# Patient Record
Sex: FEMALE | Race: WHITE | Employment: OTHER | URBAN - METROPOLITAN AREA
[De-identification: names, ages, dates, MRNs, and addresses within clinical notes are randomized per-mention and may not be internally consistent; named-entity substitution may affect disease eponyms.]

---

## 2017-01-03 ENCOUNTER — OFFICE VISIT (OUTPATIENT)
Dept: INTERNAL MEDICINE CLINIC | Facility: CLINIC | Age: 62
End: 2017-01-03

## 2017-01-03 VITALS
HEART RATE: 83 BPM | TEMPERATURE: 98 F | SYSTOLIC BLOOD PRESSURE: 112 MMHG | BODY MASS INDEX: 29.25 KG/M2 | RESPIRATION RATE: 20 BRPM | DIASTOLIC BLOOD PRESSURE: 75 MMHG | HEIGHT: 62.5 IN | WEIGHT: 163 LBS

## 2017-01-03 DIAGNOSIS — E78.5 HYPERLIPIDEMIA, UNSPECIFIED HYPERLIPIDEMIA TYPE: ICD-10-CM

## 2017-01-03 DIAGNOSIS — F33.8 SEASONAL AFFECTIVE DISORDER (HCC): ICD-10-CM

## 2017-01-03 DIAGNOSIS — F31.32 BIPOLAR I DISORDER, MOST RECENT EPISODE (OR CURRENT) DEPRESSED, MODERATE: ICD-10-CM

## 2017-01-03 DIAGNOSIS — Z00.00 PE (PHYSICAL EXAM), ROUTINE: Primary | ICD-10-CM

## 2017-01-03 DIAGNOSIS — E03.9 HYPOTHYROIDISM, UNSPECIFIED TYPE: ICD-10-CM

## 2017-01-03 DIAGNOSIS — Z12.11 SCREEN FOR COLON CANCER: ICD-10-CM

## 2017-01-03 PROCEDURE — 99396 PREV VISIT EST AGE 40-64: CPT | Performed by: INTERNAL MEDICINE

## 2017-01-03 PROCEDURE — 99213 OFFICE O/P EST LOW 20 MIN: CPT | Performed by: INTERNAL MEDICINE

## 2017-01-03 RX ORDER — GARLIC EXTRACT 500 MG
1 CAPSULE ORAL DAILY
COMMUNITY

## 2017-01-03 RX ORDER — LEVOTHYROXINE SODIUM 0.05 MG/1
50 TABLET ORAL
Qty: 90 TABLET | Refills: 0 | Status: SHIPPED | OUTPATIENT
Start: 2017-01-03 | End: 2017-04-20

## 2017-01-03 RX ORDER — MULTIVITAMIN WITH IRON
100 TABLET ORAL DAILY
COMMUNITY

## 2017-01-03 NOTE — PROGRESS NOTES
HPI:    Patient ID: Agustina Cornell is a 57year old female.     HPI Comments: Patient patient presents today for physical exam, states doing well otherwise, denies chest pain, shortness of breath, dyspnea on exertion or heart palpitations also denies nause Neurological: Negative for dizziness, vertigo, weakness and headaches. Psychiatric/Behavioral: Negative for confusion. The patient is not nervous/anxious.                Current Outpatient Prescriptions:  Levothyroxine Sodium (SYNTHROID) 50 MCG Oral Tab T Prochlorperazine Ed*    Hallucinations, Nausea and vomiting  Estrogens                 Ezetimibe               Rash  Statins                 Nausea only   PHYSICAL EXAM:   Physical Exam   Constitutional: She is oriented to person, place, and time.  She appe Pe (physical exam), routine  (primary encounter diagnosis)  Maintain a healthy balanced diet  Maintain a regular walking  As  Tolerated   Complete labs as ordered mammogram and pap  Smear  - recently  With  Her gyne - per pt  Was normal   Refer to gastro -

## 2017-01-04 ENCOUNTER — LAB ENCOUNTER (OUTPATIENT)
Dept: LAB | Age: 62
End: 2017-01-04
Attending: INTERNAL MEDICINE
Payer: COMMERCIAL

## 2017-01-04 DIAGNOSIS — Z00.00 PE (PHYSICAL EXAM), ROUTINE: ICD-10-CM

## 2017-01-04 DIAGNOSIS — E03.9 HYPOTHYROIDISM, UNSPECIFIED TYPE: ICD-10-CM

## 2017-01-04 LAB
ALBUMIN SERPL BCP-MCNC: 4.3 G/DL (ref 3.5–4.8)
ALBUMIN/GLOB SERPL: 1.7 {RATIO} (ref 1–2)
ALP SERPL-CCNC: 74 U/L (ref 32–100)
ALT SERPL-CCNC: 37 U/L (ref 14–54)
ANION GAP SERPL CALC-SCNC: 6 MMOL/L (ref 0–18)
AST SERPL-CCNC: 35 U/L (ref 15–41)
BACTERIA UR QL AUTO: NEGATIVE /HPF
BASOPHILS # BLD: 0.1 K/UL (ref 0–0.2)
BASOPHILS NFR BLD: 2 %
BILIRUB SERPL-MCNC: 0.7 MG/DL (ref 0.3–1.2)
BUN SERPL-MCNC: 17 MG/DL (ref 8–20)
BUN/CREAT SERPL: 14.7 (ref 10–20)
CALCIUM SERPL-MCNC: 9.7 MG/DL (ref 8.5–10.5)
CHLORIDE SERPL-SCNC: 109 MMOL/L (ref 95–110)
CHOLEST SERPL-MCNC: 246 MG/DL (ref 110–200)
CO2 SERPL-SCNC: 27 MMOL/L (ref 22–32)
CREAT SERPL-MCNC: 1.16 MG/DL (ref 0.5–1.5)
EOSINOPHIL # BLD: 0.2 K/UL (ref 0–0.7)
EOSINOPHIL NFR BLD: 4 %
ERYTHROCYTE [DISTWIDTH] IN BLOOD BY AUTOMATED COUNT: 13.9 % (ref 11–15)
GLOBULIN PLAS-MCNC: 2.6 G/DL (ref 2.5–3.7)
GLUCOSE SERPL-MCNC: 95 MG/DL (ref 70–99)
HCT VFR BLD AUTO: 43.5 % (ref 35–48)
HDLC SERPL-MCNC: 64 MG/DL
HGB BLD-MCNC: 14.4 G/DL (ref 12–16)
LDLC SERPL CALC-MCNC: 162 MG/DL (ref 0–99)
LYMPHOCYTES # BLD: 1.2 K/UL (ref 1–4)
LYMPHOCYTES NFR BLD: 24 %
MCH RBC QN AUTO: 30.4 PG (ref 27–32)
MCHC RBC AUTO-ENTMCNC: 33.1 G/DL (ref 32–37)
MCV RBC AUTO: 91.8 FL (ref 80–100)
MONOCYTES # BLD: 0.4 K/UL (ref 0–1)
MONOCYTES NFR BLD: 8 %
NEUTROPHILS # BLD AUTO: 3.2 K/UL (ref 1.8–7.7)
NEUTROPHILS NFR BLD: 63 %
NONHDLC SERPL-MCNC: 182 MG/DL
OSMOLALITY UR CALC.SUM OF ELEC: 295 MOSM/KG (ref 275–295)
PLATELET # BLD AUTO: 222 K/UL (ref 140–400)
PMV BLD AUTO: 9 FL (ref 7.4–10.3)
POTASSIUM SERPL-SCNC: 4.4 MMOL/L (ref 3.3–5.1)
PROT SERPL-MCNC: 6.9 G/DL (ref 5.9–8.4)
RBC # BLD AUTO: 4.74 M/UL (ref 3.7–5.4)
RBC #/AREA URNS AUTO: <1 /HPF
SODIUM SERPL-SCNC: 142 MMOL/L (ref 136–144)
TRIGL SERPL-MCNC: 99 MG/DL (ref 1–149)
TSH SERPL-ACNC: 3.69 UIU/ML (ref 0.34–5.6)
WBC # BLD AUTO: 5.2 K/UL (ref 4–11)
WBC #/AREA URNS AUTO: 1 /HPF

## 2017-01-04 PROCEDURE — 80053 COMPREHEN METABOLIC PANEL: CPT

## 2017-01-04 PROCEDURE — 81015 MICROSCOPIC EXAM OF URINE: CPT

## 2017-01-04 PROCEDURE — 36415 COLL VENOUS BLD VENIPUNCTURE: CPT

## 2017-01-04 PROCEDURE — 84443 ASSAY THYROID STIM HORMONE: CPT

## 2017-01-04 PROCEDURE — 85025 COMPLETE CBC W/AUTO DIFF WBC: CPT

## 2017-01-04 PROCEDURE — 80061 LIPID PANEL: CPT

## 2017-01-12 NOTE — PROGRESS NOTES
Quick Note:    Please call patient with test results. Labs are     CBC --blood count is good, no anemia.      CMP-sugar, electrolytes, kidney Function DECREASED - avoid ASA based medications advil Ibuprofen,,   liver function are normal.    TSH-thyroid h

## 2017-01-13 ENCOUNTER — TELEPHONE (OUTPATIENT)
Dept: INTERNAL MEDICINE CLINIC | Facility: CLINIC | Age: 62
End: 2017-01-13

## 2017-01-13 NOTE — TELEPHONE ENCOUNTER
----- Message from Allyssa Lee MD sent at 1/12/2017  9:01 AM CST -----  Please call patient with test results. Labs are     CBC --blood count is good, no anemia.      CMP-sugar, electrolytes, kidney  Function DECREASED - avoid   ASA   based medica

## 2017-02-14 ENCOUNTER — OFFICE VISIT (OUTPATIENT)
Dept: INTERNAL MEDICINE CLINIC | Facility: CLINIC | Age: 62
End: 2017-02-14

## 2017-02-14 VITALS
HEART RATE: 82 BPM | SYSTOLIC BLOOD PRESSURE: 113 MMHG | WEIGHT: 164 LBS | DIASTOLIC BLOOD PRESSURE: 75 MMHG | TEMPERATURE: 97 F | RESPIRATION RATE: 18 BRPM | BODY MASS INDEX: 29.43 KG/M2 | HEIGHT: 62.5 IN

## 2017-02-14 DIAGNOSIS — R94.4 DECREASED GFR: ICD-10-CM

## 2017-02-14 DIAGNOSIS — Z12.11 SCREEN FOR COLON CANCER: ICD-10-CM

## 2017-02-14 DIAGNOSIS — E78.5 HYPERLIPIDEMIA, UNSPECIFIED HYPERLIPIDEMIA TYPE: Primary | ICD-10-CM

## 2017-02-14 DIAGNOSIS — E03.9 HYPOTHYROIDISM, UNSPECIFIED TYPE: ICD-10-CM

## 2017-02-14 PROCEDURE — 99214 OFFICE O/P EST MOD 30 MIN: CPT | Performed by: INTERNAL MEDICINE

## 2017-02-14 PROCEDURE — 99212 OFFICE O/P EST SF 10 MIN: CPT | Performed by: INTERNAL MEDICINE

## 2017-02-14 NOTE — PROGRESS NOTES
HPI:    Patient ID: Nicole King is a 57year old female. Hyperlipidemia  This is a chronic problem. The current episode started more than 1 year ago. Exacerbating diseases include chronic renal disease and hypothyroidism.  She has no history of diabe Pyridoxine HCl (VITAMIN B-6) 100 MG Oral Tab Take 100 mg by mouth daily. Disp:  Rfl:    Acidophilus/Pectin Oral Cap Take 1 capsule by mouth daily. Disp:  Rfl:    BuPROPion HCl ER, XL, 300 MG Oral Tablet 24 Hr Take 300 mg by mouth daily.    Disp:  Rfl: and atraumatic. Right Ear: Tympanic membrane, external ear and ear canal normal.   Left Ear: Tympanic membrane, external ear and ear canal normal.   Nose: Nose normal. Right sinus exhibits no maxillary sinus tenderness and no frontal sinus tenderness.  Tamiko Gamez active advised  walking /exercise as  tolerated  · Counseling on ideal weight  Pt  Had all  Statins    Crestor Zocor  Lipitor  , pravastatin  , lopid -per Dr Kimberly Oneil . Alfa Ovalles     Did not  Tolerate  Medication -  Per patient can only  Sit     On couch  - cannot

## 2017-03-27 ENCOUNTER — OFFICE VISIT (OUTPATIENT)
Dept: NEPHROLOGY | Facility: CLINIC | Age: 62
End: 2017-03-27

## 2017-03-27 ENCOUNTER — TELEPHONE (OUTPATIENT)
Dept: NEPHROLOGY | Facility: CLINIC | Age: 62
End: 2017-03-27

## 2017-03-27 VITALS
HEIGHT: 62.5 IN | BODY MASS INDEX: 29.82 KG/M2 | HEART RATE: 86 BPM | SYSTOLIC BLOOD PRESSURE: 112 MMHG | DIASTOLIC BLOOD PRESSURE: 74 MMHG | WEIGHT: 166.19 LBS

## 2017-03-27 DIAGNOSIS — N18.30 CKD (CHRONIC KIDNEY DISEASE), STAGE III (HCC): ICD-10-CM

## 2017-03-27 DIAGNOSIS — R94.4 DECREASED GFR: Primary | ICD-10-CM

## 2017-03-27 PROCEDURE — 99244 OFF/OP CNSLTJ NEW/EST MOD 40: CPT | Performed by: INTERNAL MEDICINE

## 2017-03-27 PROCEDURE — 81002 URINALYSIS NONAUTO W/O SCOPE: CPT | Performed by: INTERNAL MEDICINE

## 2017-03-27 PROCEDURE — 99213 OFFICE O/P EST LOW 20 MIN: CPT | Performed by: INTERNAL MEDICINE

## 2017-03-27 RX ORDER — LITHIUM CARBONATE 300 MG/1
CAPSULE ORAL
COMMUNITY
End: 2017-03-27

## 2017-03-27 RX ORDER — CLONAZEPAM 2 MG/1
2.25 TABLET ORAL NIGHTLY
COMMUNITY

## 2017-03-27 RX ORDER — LEVOTHYROXINE SODIUM 0.05 MG/1
TABLET ORAL
COMMUNITY
End: 2017-03-27

## 2017-03-27 RX ORDER — HYOSCYAMINE SULFATE 0.125 MG
TABLET ORAL
COMMUNITY
End: 2017-03-27

## 2017-03-27 RX ORDER — BUPROPION HYDROCHLORIDE 150 MG/1
TABLET ORAL
Refills: 5 | COMMUNITY
Start: 2017-03-02 | End: 2018-11-15 | Stop reason: DRUGHIGH

## 2017-03-27 NOTE — PATIENT INSTRUCTIONS
Drink 5-6 glasses of water a day   Stop advil, ibuprofen   Blood test in one week   Follow up in 3-4 weeks

## 2017-03-27 NOTE — PROGRESS NOTES
Consult Requested By: Dr. Corazon Link     Reason for Consult: kidney dysfunction     HPI:     Konrad Perdue is a 57 yrs old female with pmh of IBS, bipolar disorder on lithium, hypothyroidism, CKD stage III who presented today for work up and [employer] • Lung Disorder Mother      emphysema   • Heart Disease Mother      CAD   • Schizophrenia Brother    • Arthritis Brother    • Sleep Apnea [Other] [OTHER] Brother    • Breast Cancer Mother      dx in 66's      Social History:   Smoking Status: Never Smoke (ESTRACE) 0.1 MG/GM Vaginal Cream Use 1 gm per vagina daily for 14 days and then twice weekly.  Disp: 1 Tube Rfl: 4   Spacer/Aero-Holding Chambers (VALVED HOLDING CHAMBER) Does not apply Device  Disp:  Rfl:        Allergies:    Crocus                      C noted  Musculoskeletal:  no deformities  Extremities: no edema  Neurological:  Grossly normal     ASSESSMENT/PLAN:     1. CKD stage stage III : non proteinuric  - creatinine stable at 1.1 - 1.15 mg/dl with an eGFR 49-50 ml/min since 2015  - was on NSAIDs -

## 2017-04-03 ENCOUNTER — TELEPHONE (OUTPATIENT)
Dept: NEPHROLOGY | Facility: CLINIC | Age: 62
End: 2017-04-03

## 2017-04-03 NOTE — TELEPHONE ENCOUNTER
Contacted pt and notified her of RSA's message below. She is going to lab tomorrow to do renal function panel. Per RSA, she may also do urine testing. Pt stated understanding.

## 2017-04-04 ENCOUNTER — APPOINTMENT (OUTPATIENT)
Dept: LAB | Age: 62
End: 2017-04-04
Attending: INTERNAL MEDICINE
Payer: COMMERCIAL

## 2017-04-04 DIAGNOSIS — N18.30 CKD (CHRONIC KIDNEY DISEASE), STAGE III (HCC): ICD-10-CM

## 2017-04-04 PROCEDURE — 36415 COLL VENOUS BLD VENIPUNCTURE: CPT

## 2017-04-04 PROCEDURE — 82570 ASSAY OF URINE CREATININE: CPT

## 2017-04-04 PROCEDURE — 81001 URINALYSIS AUTO W/SCOPE: CPT

## 2017-04-04 PROCEDURE — 84156 ASSAY OF PROTEIN URINE: CPT

## 2017-04-04 PROCEDURE — 80069 RENAL FUNCTION PANEL: CPT

## 2017-04-04 PROCEDURE — 82043 UR ALBUMIN QUANTITATIVE: CPT

## 2017-04-05 ENCOUNTER — TELEPHONE (OUTPATIENT)
Dept: NEPHROLOGY | Facility: CLINIC | Age: 62
End: 2017-04-05

## 2017-04-05 NOTE — TELEPHONE ENCOUNTER
Pt calling with info for  to call her psychologist - Dr Sanders [facility] [phone]

## 2017-04-06 NOTE — TELEPHONE ENCOUNTER
Call the number and left message for ruben with my cell number for Dr. Mac Dominguez to call back .      Staff pls Let patient know

## 2017-04-20 NOTE — TELEPHONE ENCOUNTER
Hypothyroid Medications  Protocol Criteria:  Appointment scheduled in the past 12 months or the next 3 months  TSH resulted in the past 12 months that is normal  Recent Visits       Provider Department Primary Dx    2 months ago MD Micky Shen

## 2017-04-22 RX ORDER — LEVOTHYROXINE SODIUM 0.05 MG/1
TABLET ORAL
Qty: 90 TABLET | Refills: 3 | Status: SHIPPED | OUTPATIENT
Start: 2017-04-22 | End: 2018-10-11

## 2017-04-25 ENCOUNTER — OFFICE VISIT (OUTPATIENT)
Dept: NEPHROLOGY | Facility: CLINIC | Age: 62
End: 2017-04-25

## 2017-04-25 VITALS
WEIGHT: 164.63 LBS | TEMPERATURE: 98 F | HEART RATE: 90 BPM | HEIGHT: 62.5 IN | SYSTOLIC BLOOD PRESSURE: 117 MMHG | DIASTOLIC BLOOD PRESSURE: 73 MMHG | BODY MASS INDEX: 29.54 KG/M2

## 2017-04-25 DIAGNOSIS — N18.30 CKD (CHRONIC KIDNEY DISEASE), STAGE III (HCC): Primary | ICD-10-CM

## 2017-04-25 PROCEDURE — 99214 OFFICE O/P EST MOD 30 MIN: CPT | Performed by: INTERNAL MEDICINE

## 2017-04-25 PROCEDURE — G0463 HOSPITAL OUTPT CLINIC VISIT: HCPCS | Performed by: INTERNAL MEDICINE

## 2017-04-25 NOTE — PROGRESS NOTES
Progress Note:     Ned Steiner is a 57 yrs old female with pmh of IBS, bipolar disorder on lithium, hypothyroidism, CKD stage III who presented today for work up and evaluation of kidney disease     Patient follows with psychiatrist Dr. Dayna Cervantes Mother      emphysema   • Heart Disease Mother      CAD   • Schizophrenia Brother    • Arthritis Brother    • Sleep Apnea [Other] [OTHER] Brother    • Breast Cancer Mother      dx in 66's      Social History:   Smoking Status: Never Smoker 4 (four) hours as needed. Disp: 1 Inhaler Rfl: 2       Allergies:    Crocus                      Comment:Other reaction(s): Other (See Comments)             \"feels like I'm going to pass out for hours. \"  Demerol                 Hallucinations  Dust : non proteinuric  - creatinine stable at 1.1 mg/dl with an eGFR 51 ml/min since 2015  - UA unremarkable   - UACR and UPCR negative   - lithium is associated with various renal pathology - chronic interstitial nephritis, proteinuria with FSGS, nephrogenic

## 2017-08-08 ENCOUNTER — OFFICE VISIT (OUTPATIENT)
Dept: INTERNAL MEDICINE CLINIC | Facility: CLINIC | Age: 62
End: 2017-08-08

## 2017-08-08 ENCOUNTER — TELEPHONE (OUTPATIENT)
Dept: INTERNAL MEDICINE CLINIC | Facility: CLINIC | Age: 62
End: 2017-08-08

## 2017-08-08 ENCOUNTER — APPOINTMENT (OUTPATIENT)
Dept: LAB | Age: 62
End: 2017-08-08
Attending: INTERNAL MEDICINE
Payer: COMMERCIAL

## 2017-08-08 VITALS
DIASTOLIC BLOOD PRESSURE: 75 MMHG | SYSTOLIC BLOOD PRESSURE: 116 MMHG | TEMPERATURE: 97 F | HEART RATE: 79 BPM | WEIGHT: 162 LBS | HEIGHT: 62 IN | BODY MASS INDEX: 29.81 KG/M2

## 2017-08-08 DIAGNOSIS — N18.30 CKD (CHRONIC KIDNEY DISEASE), STAGE III (HCC): ICD-10-CM

## 2017-08-08 DIAGNOSIS — Z01.818 PREOP EXAM FOR INTERNAL MEDICINE: ICD-10-CM

## 2017-08-08 DIAGNOSIS — Z01.818 PREOP EXAM FOR INTERNAL MEDICINE: Primary | ICD-10-CM

## 2017-08-08 DIAGNOSIS — M21.612 BUNION OF LEFT FOOT: ICD-10-CM

## 2017-08-08 LAB
ALBUMIN SERPL BCP-MCNC: 4.3 G/DL (ref 3.5–4.8)
ANION GAP SERPL CALC-SCNC: 5 MMOL/L (ref 0–18)
BUN SERPL-MCNC: 17 MG/DL (ref 8–20)
BUN/CREAT SERPL: 16 (ref 10–20)
CALCIUM SERPL-MCNC: 9.6 MG/DL (ref 8.5–10.5)
CHLORIDE SERPL-SCNC: 108 MMOL/L (ref 95–110)
CO2 SERPL-SCNC: 26 MMOL/L (ref 22–32)
CREAT SERPL-MCNC: 1.06 MG/DL (ref 0.5–1.5)
GLUCOSE SERPL-MCNC: 80 MG/DL (ref 70–99)
INR BLD: 1 (ref 0.9–1.2)
OSMOLALITY UR CALC.SUM OF ELEC: 289 MOSM/KG (ref 275–295)
PHOSPHATE SERPL-MCNC: 3.9 MG/DL (ref 2.4–4.7)
POTASSIUM SERPL-SCNC: 4.3 MMOL/L (ref 3.3–5.1)
PROTHROMBIN TIME: 13 SECONDS (ref 11.8–14.5)
SODIUM SERPL-SCNC: 139 MMOL/L (ref 136–144)

## 2017-08-08 PROCEDURE — 93010 ELECTROCARDIOGRAM REPORT: CPT | Performed by: INTERNAL MEDICINE

## 2017-08-08 PROCEDURE — G0463 HOSPITAL OUTPT CLINIC VISIT: HCPCS | Performed by: INTERNAL MEDICINE

## 2017-08-08 PROCEDURE — 80069 RENAL FUNCTION PANEL: CPT

## 2017-08-08 PROCEDURE — 93005 ELECTROCARDIOGRAM TRACING: CPT | Performed by: INTERNAL MEDICINE

## 2017-08-08 PROCEDURE — 36415 COLL VENOUS BLD VENIPUNCTURE: CPT

## 2017-08-08 PROCEDURE — 85610 PROTHROMBIN TIME: CPT

## 2017-08-08 PROCEDURE — 99214 OFFICE O/P EST MOD 30 MIN: CPT | Performed by: INTERNAL MEDICINE

## 2017-08-08 NOTE — PATIENT INSTRUCTIONS
Bunion    You have a bunion. This is a bony bump at the base of your big toe, along the inside edge of your foot. As the bump gets bigger, it can become red, swollen, and painful with shoe wear.   Bunions may occur if you wear shoes that are too tight and · To make an ice pack, put ice cubes in a sealed zip-lock plastic bag. Wrap the bag in a clean, thin towel or cloth. Never put ice or an ice pack directly on the skin.   · You may use over-the-counter pain medicine to control pain, unless another medicine w Physical therapy with ultrasound or whirlpool baths can ease pain, redness, and swelling. Severe cases may require surgery. If you don’t treat what is causing the bunion, it may get larger and more painful. Home care  · Limit high heels.  These shoes force © 6535-9030 [address], [address]. All rights reserved. This information is not intended as a substitute for professional medical care. Always follow your healthcare professional's instructions.

## 2017-08-08 NOTE — PROGRESS NOTES
Patient ID: Grayson Luciano is a 60year old female. Patient presents with:  Pre-Op Exam: bunion surgery left foot   Foot Pain: pain starting in right   Lab: was told Dr Tim Atkinson would decide if lab/EKG was needed.         HISTORY OF PRESENT ILLNESS:   HPI •  ClonazePAM (KLONOPIN) 2 MG Oral Tab, Take by mouth., Disp: , Rfl:   •  BuPROPion HCl ER, XL, 150 MG Oral Tablet 24 Hr, TK 1 T PO QD, Disp: , Rfl: 5  •  Pyridoxine HCl (VITAMIN B-6) 100 MG Oral Tab, Take 100 mg by mouth daily. , Disp: , Rfl:   •  Acidophi Smokeless tobacco: Never Used    Alcohol use No    Drug use: No    Sexual activity: Yes    Partners: Male    Birth control/ protection: Hysterectomy     Other Topics Concern    Caffeine Concern No     Social History Narrative   None on file           PHYS

## 2017-11-02 ENCOUNTER — HOSPITAL ENCOUNTER (OUTPATIENT)
Age: 62
Discharge: HOME | End: 2017-11-02
Attending: FAMILY MEDICINE
Payer: COMMERCIAL

## 2017-11-02 VITALS
OXYGEN SATURATION: 96 % | RESPIRATION RATE: 20 BRPM | WEIGHT: 158 LBS | HEIGHT: 62 IN | SYSTOLIC BLOOD PRESSURE: 127 MMHG | HEART RATE: 87 BPM | DIASTOLIC BLOOD PRESSURE: 65 MMHG | TEMPERATURE: 98 F | BODY MASS INDEX: 29.08 KG/M2

## 2017-11-02 DIAGNOSIS — S06.0X0A CONCUSSION WITHOUT LOSS OF CONSCIOUSNESS, INITIAL ENCOUNTER: ICD-10-CM

## 2017-11-02 DIAGNOSIS — S09.90XA CLOSED HEAD INJURY, INITIAL ENCOUNTER: Primary | ICD-10-CM

## 2017-11-02 PROCEDURE — 99213 OFFICE O/P EST LOW 20 MIN: CPT
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
## 2017-12-18 ENCOUNTER — OFFICE VISIT (OUTPATIENT)
Dept: NEPHROLOGY | Facility: CLINIC | Age: 62
End: 2017-12-18

## 2017-12-18 ENCOUNTER — APPOINTMENT (OUTPATIENT)
Dept: LAB | Facility: HOSPITAL | Age: 62
End: 2017-12-18
Attending: INTERNAL MEDICINE
Payer: COMMERCIAL

## 2017-12-18 VITALS
TEMPERATURE: 98 F | BODY MASS INDEX: 29.96 KG/M2 | DIASTOLIC BLOOD PRESSURE: 72 MMHG | WEIGHT: 162.81 LBS | SYSTOLIC BLOOD PRESSURE: 115 MMHG | HEIGHT: 62 IN | HEART RATE: 80 BPM

## 2017-12-18 DIAGNOSIS — N18.30 CKD (CHRONIC KIDNEY DISEASE), STAGE III (HCC): Primary | ICD-10-CM

## 2017-12-18 DIAGNOSIS — N18.30 CKD (CHRONIC KIDNEY DISEASE), STAGE III (HCC): ICD-10-CM

## 2017-12-18 PROCEDURE — 99212 OFFICE O/P EST SF 10 MIN: CPT | Performed by: INTERNAL MEDICINE

## 2017-12-18 PROCEDURE — 80069 RENAL FUNCTION PANEL: CPT

## 2017-12-18 PROCEDURE — 99213 OFFICE O/P EST LOW 20 MIN: CPT | Performed by: INTERNAL MEDICINE

## 2017-12-18 PROCEDURE — 36415 COLL VENOUS BLD VENIPUNCTURE: CPT

## 2017-12-18 NOTE — PROGRESS NOTES
Progress Note:     Elliot Silva is a 57 yrs old female with pmh of IBS, bipolar disorder on lithium, hypothyroidism, CKD stage III who presented today for follow up     Patient follows with psychiatrist Dr. Elvia Holbrook in [facility].  On lithium for 35 Disorder Mother      emphysema   • Heart Disease Mother      CAD   • Schizophrenia Brother    • Arthritis Brother    • Sleep Apnea [Other] [OTHER] Brother    • Breast Cancer Mother      dx in 66's      Social History: Smoking status: Never Smoker Comment:Other reaction(s): Other (See Comments)             \"feels like I'm going to pass out for hours. \"  Demerol                 Hallucinations  Dust                    Wheezing  Mold                    Coughing  Prochlorperazine Ed* 2015 - repeat test   - UA unremarkable   - UACR and UPCR negative   - lithium is associated with various renal pathology - chronic interstitial nephritis, proteinuria with FSGS, nephrogenic DI etc   - damage secondary to lithium is associated with cumulati

## 2017-12-21 ENCOUNTER — OFFICE VISIT (OUTPATIENT)
Dept: INTERNAL MEDICINE CLINIC | Facility: CLINIC | Age: 62
End: 2017-12-21

## 2017-12-21 VITALS
RESPIRATION RATE: 21 BRPM | TEMPERATURE: 97 F | WEIGHT: 160 LBS | BODY MASS INDEX: 29 KG/M2 | HEART RATE: 85 BPM | SYSTOLIC BLOOD PRESSURE: 155 MMHG | DIASTOLIC BLOOD PRESSURE: 71 MMHG | OXYGEN SATURATION: 99 %

## 2017-12-21 DIAGNOSIS — J01.90 ACUTE SINUSITIS, RECURRENCE NOT SPECIFIED, UNSPECIFIED LOCATION: Primary | ICD-10-CM

## 2017-12-21 DIAGNOSIS — N18.30 CHRONIC KIDNEY DISEASE, STAGE III (MODERATE) (HCC): ICD-10-CM

## 2017-12-21 PROCEDURE — 99214 OFFICE O/P EST MOD 30 MIN: CPT | Performed by: INTERNAL MEDICINE

## 2017-12-21 PROCEDURE — G0463 HOSPITAL OUTPT CLINIC VISIT: HCPCS | Performed by: INTERNAL MEDICINE

## 2017-12-21 RX ORDER — CEFUROXIME AXETIL 500 MG/1
500 TABLET ORAL 2 TIMES DAILY
Qty: 14 TABLET | Refills: 0 | Status: SHIPPED | OUTPATIENT
Start: 2017-12-21 | End: 2018-11-15 | Stop reason: ALTCHOICE

## 2017-12-22 NOTE — PROGRESS NOTES
HPI:    Patient ID: Mccoy Dubin is a 60year old female.   Presents for evaluation of the head congestion, concern of the kidney problem    HPI  Patient reports that for about 2 weeks she has been feeling congestion in her head, difficulty breathing fro Inhalation Nebu Soln Inhale into the lungs. Disp:  Rfl:    ClonazePAM (KLONOPIN) 2 MG Oral Tab Take by mouth.  Disp:  Rfl:    BuPROPion HCl ER, XL, 150 MG Oral Tablet 24 Hr TK 1 T PO QD Disp:  Rfl: 5   Pyridoxine HCl (VITAMIN B-6) 100 MG Oral Tab Take 100 m Atraumatic. Left Ear: No cerumen present  Nose: Mucosal edema present. No rhinorrhea. Right sinus exhibits frontal sinus tenderness. Left sinus exhibits maxillary sinus tenderness and frontal sinus tenderness.    Mouth/Throat: Oropharynx is clear and mois

## 2017-12-26 ENCOUNTER — MED REC SCAN ONLY (OUTPATIENT)
Dept: INTERNAL MEDICINE CLINIC | Facility: CLINIC | Age: 62
End: 2017-12-26

## 2017-12-28 ENCOUNTER — HOSPITAL ENCOUNTER (OUTPATIENT)
Dept: ULTRASOUND IMAGING | Age: 62
Discharge: HOME | End: 2017-12-28
Attending: INTERNAL MEDICINE
Payer: COMMERCIAL

## 2017-12-28 DIAGNOSIS — R94.4 DECREASED GFR: ICD-10-CM

## 2017-12-28 PROCEDURE — 76770 US EXAM ABDO BACK WALL COMP: CPT | Performed by: INTERNAL MEDICINE

## 2017-12-31 ENCOUNTER — HOSPITAL ENCOUNTER (OUTPATIENT)
Age: 62
Discharge: HOME | End: 2017-12-31
Payer: COMMERCIAL

## 2017-12-31 ENCOUNTER — APPOINTMENT (OUTPATIENT)
Dept: GENERAL RADIOLOGY | Age: 62
End: 2017-12-31
Attending: NURSE PRACTITIONER
Payer: COMMERCIAL

## 2017-12-31 VITALS
HEART RATE: 83 BPM | WEIGHT: 160 LBS | RESPIRATION RATE: 20 BRPM | SYSTOLIC BLOOD PRESSURE: 119 MMHG | OXYGEN SATURATION: 100 % | DIASTOLIC BLOOD PRESSURE: 64 MMHG | TEMPERATURE: 98 F | BODY MASS INDEX: 29.44 KG/M2 | HEIGHT: 62 IN

## 2017-12-31 DIAGNOSIS — S86.911A STRAIN OF RIGHT KNEE, INITIAL ENCOUNTER: Primary | ICD-10-CM

## 2017-12-31 PROCEDURE — 73562 X-RAY EXAM OF KNEE 3: CPT | Performed by: NURSE PRACTITIONER

## 2017-12-31 PROCEDURE — 99213 OFFICE O/P EST LOW 20 MIN: CPT

## 2017-12-31 RX ORDER — ACETAMINOPHEN 500 MG
1000 TABLET ORAL ONCE
Status: COMPLETED | OUTPATIENT
Start: 2017-12-31 | End: 2017-12-31

## 2017-12-31 NOTE — ED PROVIDER NOTES
Patient presents with:  Musculoskeletal Problem      HPI:     Rach Nicholson is a 60year old female who presents today with a chief complaint of pain in the right knee after twisting her knee last night when she switched positions.   She states she has a systems reviewed and negative except as noted above. Constitutional and Vital Signs Reviewed.       Physical Exam:   Findings:  EXTREMITIES: no cyanosis or edema   Edema  No  Bruising:  No  Tendon function intact:  Yes  Skin intact:  Yes  Normal sensation: dose of Tylenol was given for discomfort. I recommended ice, elevation, Tylenol for discomfort, and close follow-up with orthopedics.     Diagnosis:    ICD-10-CM    1. Strain of right knee, initial encounter W73.711U        All results reviewed and discuss

## 2018-03-14 ENCOUNTER — HOSPITAL ENCOUNTER (OUTPATIENT)
Age: 63
Discharge: HOME | End: 2018-03-14
Payer: COMMERCIAL

## 2018-03-14 ENCOUNTER — NURSE TRIAGE (OUTPATIENT)
Dept: OTHER | Age: 63
End: 2018-03-14

## 2018-03-14 ENCOUNTER — APPOINTMENT (OUTPATIENT)
Dept: GENERAL RADIOLOGY | Age: 63
End: 2018-03-14
Attending: PHYSICIAN ASSISTANT
Payer: COMMERCIAL

## 2018-03-14 VITALS
TEMPERATURE: 98 F | OXYGEN SATURATION: 100 % | DIASTOLIC BLOOD PRESSURE: 79 MMHG | HEART RATE: 87 BPM | RESPIRATION RATE: 16 BRPM | WEIGHT: 158 LBS | BODY MASS INDEX: 29 KG/M2 | SYSTOLIC BLOOD PRESSURE: 149 MMHG

## 2018-03-14 DIAGNOSIS — J06.9 VIRAL UPPER RESPIRATORY TRACT INFECTION: Primary | ICD-10-CM

## 2018-03-14 PROCEDURE — 71046 X-RAY EXAM CHEST 2 VIEWS: CPT | Performed by: PHYSICIAN ASSISTANT

## 2018-03-14 PROCEDURE — 99214 OFFICE O/P EST MOD 30 MIN: CPT

## 2018-03-14 PROCEDURE — 99213 OFFICE O/P EST LOW 20 MIN: CPT

## 2018-03-14 RX ORDER — ALBUTEROL SULFATE 90 UG/1
2 AEROSOL, METERED RESPIRATORY (INHALATION) EVERY 4 HOURS PRN
Qty: 1 INHALER | Refills: 0 | Status: SHIPPED | OUTPATIENT
Start: 2018-03-14 | End: 2018-04-13

## 2018-03-14 NOTE — ED INITIAL ASSESSMENT (HPI)
Headache  Dry cough  Wheezing  Crackling in left chest  No fever  Symptoms started last night  No change with Albuterol inhaler    HX: sinusitis 12/2017  -flu vaccine
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
## 2018-03-14 NOTE — TELEPHONE ENCOUNTER
Action Requested: Summary for Provider     []  Critical Lab, Recommendations Needed  [] Need Additional Advice  []   FYI    []   Need Orders  [] Need Medications Sent to Pharmacy  []  Other     SUMMARY: Advised to go to [facility] now; she agreed, plan

## 2018-04-19 ENCOUNTER — CHARTING TRANS (OUTPATIENT)
Dept: OTHER | Age: 63
End: 2018-04-19

## 2018-05-15 ENCOUNTER — IMAGING SERVICES (OUTPATIENT)
Dept: OTHER | Age: 63
End: 2018-05-15

## 2018-05-15 ENCOUNTER — HOSPITAL (OUTPATIENT)
Dept: OTHER | Age: 63
End: 2018-05-15
Attending: LEGAL MEDICINE

## 2018-10-11 DIAGNOSIS — R74.8 ELEVATED LIVER ENZYMES: Primary | ICD-10-CM

## 2018-10-12 NOTE — TELEPHONE ENCOUNTER
Failed per nursing protocol - please advise on refill request     Hypothyroid Medications  Protocol Criteria:  Appointment scheduled in the past 12 months or the next 3 months  TSH resulted in the past 12 months that is normal  Recent Outpatient Visits            9 months ago Acute sinusitis, recurrence not specified, unspecified location    Sd Barnes [place], MD    Office Visit    9 months ago CKD (chronic kidney disease), stage Stephanie Guidryer, [address] [place] [address], Oscar Quintana MD    Office Visit    1 year ago Preop exam for internal medicine    Paco Barnes, Kim Fontaine MD    Office Visit    1 year ago CKD (chronic kidney disease), stage Mariano De [place], MD    Office Visit    1 year ago Decreased GFR    Wilber Knutson [place], MD    Office Visit        Future Appointments       Provider Department Appt Notes    In 1 week Valentine Moon MD [facility], [address], [place] [facility] px , f/u on meds        Lab Results   Component Value Date    TSH 3.69 01/04/2017

## 2018-10-14 RX ORDER — LEVOTHYROXINE SODIUM 0.05 MG/1
TABLET ORAL
Qty: 90 TABLET | Refills: 0 | Status: SHIPPED | OUTPATIENT
Start: 2018-10-14 | End: 2018-11-15

## 2018-11-01 VITALS
SYSTOLIC BLOOD PRESSURE: 114 MMHG | HEART RATE: 72 BPM | DIASTOLIC BLOOD PRESSURE: 72 MMHG | WEIGHT: 138.19 LBS | HEIGHT: 62 IN | BODY MASS INDEX: 25.43 KG/M2

## 2018-11-15 ENCOUNTER — OFFICE VISIT (OUTPATIENT)
Dept: INTERNAL MEDICINE CLINIC | Facility: CLINIC | Age: 63
End: 2018-11-15
Payer: COMMERCIAL

## 2018-11-15 VITALS
WEIGHT: 166.19 LBS | SYSTOLIC BLOOD PRESSURE: 127 MMHG | TEMPERATURE: 98 F | RESPIRATION RATE: 20 BRPM | HEART RATE: 92 BPM | HEIGHT: 62 IN | DIASTOLIC BLOOD PRESSURE: 77 MMHG | BODY MASS INDEX: 30.58 KG/M2

## 2018-11-15 DIAGNOSIS — E55.9 VITAMIN D DEFICIENCY: ICD-10-CM

## 2018-11-15 DIAGNOSIS — Z23 INFLUENZA VACCINATION GIVEN: ICD-10-CM

## 2018-11-15 DIAGNOSIS — Z00.00 PE (PHYSICAL EXAM), ROUTINE: Primary | ICD-10-CM

## 2018-11-15 DIAGNOSIS — Z12.11 SCREENING FOR MALIGNANT NEOPLASM OF COLON: ICD-10-CM

## 2018-11-15 DIAGNOSIS — M79.671 RIGHT FOOT PAIN: ICD-10-CM

## 2018-11-15 DIAGNOSIS — F31.32 MODERATE DEPRESSED BIPOLAR I DISORDER (HCC): ICD-10-CM

## 2018-11-15 DIAGNOSIS — E03.9 HYPOTHYROIDISM, UNSPECIFIED TYPE: ICD-10-CM

## 2018-11-15 DIAGNOSIS — E78.5 HYPERLIPIDEMIA, UNSPECIFIED HYPERLIPIDEMIA TYPE: ICD-10-CM

## 2018-11-15 PROCEDURE — 99214 OFFICE O/P EST MOD 30 MIN: CPT | Performed by: INTERNAL MEDICINE

## 2018-11-15 PROCEDURE — 90471 IMMUNIZATION ADMIN: CPT | Performed by: INTERNAL MEDICINE

## 2018-11-15 PROCEDURE — 99212 OFFICE O/P EST SF 10 MIN: CPT | Performed by: INTERNAL MEDICINE

## 2018-11-15 PROCEDURE — 90686 IIV4 VACC NO PRSV 0.5 ML IM: CPT | Performed by: INTERNAL MEDICINE

## 2018-11-15 RX ORDER — LEVOTHYROXINE SODIUM 0.05 MG/1
TABLET ORAL
Qty: 90 TABLET | Refills: 1 | Status: SHIPPED | OUTPATIENT
Start: 2018-11-15

## 2018-11-15 RX ORDER — BUPROPION HYDROCHLORIDE 300 MG/1
TABLET ORAL
COMMUNITY
Start: 2018-11-14

## 2018-11-15 NOTE — PROGRESS NOTES
HPI:    Patient ID: Halina Mancuso is a 58year old female.   Patient presents with:  Physical: Pt is presenting today for a physical   ,evl    Patient presents today for physical exam and  Thyroid check , states doing well otherwise, she states she is s Psychiatric/Behavioral: Negative for confusion. The patient is not nervous/anxious.                Current Outpatient Medications:  BuPROPion HCl ER, XL, 300 MG Oral Tablet 24 Hr  Disp:  Rfl:    Levothyroxine Sodium 50 MCG Oral Tab TAKE 1 TABLET BEFORE YON Ezetimibe               RASH  Statins                 OTHER (SEE COMMENTS)    Comment:Weakness   PHYSICAL EXAM:   Physical Exam   Constitutional: She is oriented to person, place, and time. She appears well-developed and well-nourished. No distress.    over Blood pressure 127/77, pulse 92, temperature 98.3 °F (36.8 °C), temperature source Oral, resp. rate 20, height 5' 2\" (1.575 m), weight 166 lb 3.2 oz (75.4 kg), not currently breastfeeding.              ASSESSMENT/PLAN:   Pe (physical exam), routine  (prima -Testing was done with  [facility] -    Per  Patient   Was  normal  -   advised mammogram f/u in  1  Year -health testing completed in-  8/18 -patient states she will bring next time her test results and records        osteopenia -DEXA scan completed August 18

## 2018-11-16 ENCOUNTER — LAB ENCOUNTER (OUTPATIENT)
Dept: LAB | Age: 63
End: 2018-11-16
Attending: INTERNAL MEDICINE
Payer: COMMERCIAL

## 2018-11-16 DIAGNOSIS — E03.9 HYPOTHYROIDISM, UNSPECIFIED TYPE: ICD-10-CM

## 2018-11-16 DIAGNOSIS — E55.9 VITAMIN D DEFICIENCY: ICD-10-CM

## 2018-11-16 DIAGNOSIS — Z00.00 PE (PHYSICAL EXAM), ROUTINE: ICD-10-CM

## 2018-11-16 DIAGNOSIS — E78.5 HYPERLIPIDEMIA, UNSPECIFIED HYPERLIPIDEMIA TYPE: ICD-10-CM

## 2018-11-16 PROCEDURE — 82306 VITAMIN D 25 HYDROXY: CPT

## 2018-11-16 PROCEDURE — 81015 MICROSCOPIC EXAM OF URINE: CPT

## 2018-11-16 PROCEDURE — 80061 LIPID PANEL: CPT

## 2018-11-16 PROCEDURE — 80053 COMPREHEN METABOLIC PANEL: CPT

## 2018-11-16 PROCEDURE — 36415 COLL VENOUS BLD VENIPUNCTURE: CPT

## 2018-11-16 PROCEDURE — 85025 COMPLETE CBC W/AUTO DIFF WBC: CPT

## 2018-11-16 PROCEDURE — 84443 ASSAY THYROID STIM HORMONE: CPT

## 2018-11-21 ENCOUNTER — NURSE TRIAGE (OUTPATIENT)
Dept: OTHER | Age: 63
End: 2018-11-21

## 2018-11-21 NOTE — TELEPHONE ENCOUNTER
Action Requested: Summary for Provider     []  Critical Lab, Recommendations Needed  [] Need Additional Advice  []   FYI    []   Need Orders  [] Need Medications Sent to Pharmacy  []  Other     SUMMARY: Dr Frankey Bowl: patient asking what she can take or d

## 2018-11-26 NOTE — TELEPHONE ENCOUNTER
LMTCB  Transfer to triage. The patient has not looked at the [facility] message as yet.

## 2018-11-29 ENCOUNTER — HOSPITAL ENCOUNTER (OUTPATIENT)
Dept: GENERAL RADIOLOGY | Age: 63
Discharge: HOME | End: 2018-11-29
Attending: INTERNAL MEDICINE
Payer: COMMERCIAL

## 2018-11-29 ENCOUNTER — APPOINTMENT (OUTPATIENT)
Dept: LAB | Age: 63
End: 2018-11-29
Attending: INTERNAL MEDICINE
Payer: COMMERCIAL

## 2018-11-29 DIAGNOSIS — G89.29 CHRONIC TOE PAIN, RIGHT FOOT: ICD-10-CM

## 2018-11-29 DIAGNOSIS — R74.8 ELEVATED LIVER ENZYMES: ICD-10-CM

## 2018-11-29 DIAGNOSIS — M79.674 CHRONIC TOE PAIN, RIGHT FOOT: ICD-10-CM

## 2018-11-29 PROCEDURE — 86708 HEPATITIS A ANTIBODY: CPT

## 2018-11-29 PROCEDURE — 73630 X-RAY EXAM OF FOOT: CPT | Performed by: INTERNAL MEDICINE

## 2018-11-29 PROCEDURE — 36415 COLL VENOUS BLD VENIPUNCTURE: CPT

## 2018-11-29 PROCEDURE — 86706 HEP B SURFACE ANTIBODY: CPT

## 2018-11-29 PROCEDURE — 87340 HEPATITIS B SURFACE AG IA: CPT

## 2018-11-29 PROCEDURE — 80500 HEPATITIS A B + C PROFILE: CPT

## 2018-11-29 PROCEDURE — 82977 ASSAY OF GGT: CPT

## 2018-11-29 PROCEDURE — 86803 HEPATITIS C AB TEST: CPT

## 2018-11-29 PROCEDURE — 80053 COMPREHEN METABOLIC PANEL: CPT

## 2018-11-29 PROCEDURE — 86704 HEP B CORE ANTIBODY TOTAL: CPT

## 2018-12-03 ENCOUNTER — TELEPHONE (OUTPATIENT)
Dept: OTHER | Age: 63
End: 2018-12-03
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
## 2018-12-03 NOTE — TELEPHONE ENCOUNTER
----- Message from Sparkle Ogden MD sent at 12/3/2018  8:14 AM CST -----  Please call patient liver enzymes are normal but  -hepatitis B core antibody present-possible previous exposure from hepatitis B-would like patient to see gastroenterologist-for

## 2018-12-04 NOTE — TELEPHONE ENCOUNTER
Patient need to see the gastroenterologist-for the liver elevated enzymes patient has that on and off for some time  Also that positive hepatitis B and C core antibody-make sure patient does not have any active disease  Patient does have ultrasound of the

## 2018-12-04 NOTE — TELEPHONE ENCOUNTER
Called pt. Pt refused to here the recommendation or results. Pt states she doing fine. Pt verbalized will call tomorrow to have her records faxed over to another provider. Informed pt to call Medical records to have chart faxed to the new provider.  Daniela Humphries

## 2018-12-13 ENCOUNTER — HOSPITAL ENCOUNTER (OUTPATIENT)
Dept: ULTRASOUND IMAGING | Age: 63
Discharge: HOME | End: 2018-12-13
Attending: INTERNAL MEDICINE
Payer: COMMERCIAL

## 2018-12-13 DIAGNOSIS — R74.8 ELEVATED LIVER ENZYMES: ICD-10-CM

## 2018-12-13 PROCEDURE — 76705 ECHO EXAM OF ABDOMEN: CPT | Performed by: INTERNAL MEDICINE

## 2018-12-20 ENCOUNTER — APPOINTMENT (OUTPATIENT)
Dept: GENERAL RADIOLOGY | Age: 63
End: 2018-12-20
Attending: EMERGENCY MEDICINE
Payer: COMMERCIAL

## 2018-12-20 ENCOUNTER — HOSPITAL ENCOUNTER (OUTPATIENT)
Age: 63
Discharge: HOME | End: 2018-12-20
Attending: EMERGENCY MEDICINE
Payer: COMMERCIAL

## 2018-12-20 ENCOUNTER — NURSE TRIAGE (OUTPATIENT)
Dept: OTHER | Age: 63
End: 2018-12-20

## 2018-12-20 VITALS
OXYGEN SATURATION: 99 % | TEMPERATURE: 98 F | DIASTOLIC BLOOD PRESSURE: 60 MMHG | SYSTOLIC BLOOD PRESSURE: 139 MMHG | HEART RATE: 95 BPM | RESPIRATION RATE: 20 BRPM

## 2018-12-20 DIAGNOSIS — J04.0 ACUTE LARYNGITIS: ICD-10-CM

## 2018-12-20 DIAGNOSIS — J40 BRONCHITIS: Primary | ICD-10-CM

## 2018-12-20 PROCEDURE — 71046 X-RAY EXAM CHEST 2 VIEWS: CPT | Performed by: EMERGENCY MEDICINE

## 2018-12-20 PROCEDURE — 99213 OFFICE O/P EST LOW 20 MIN: CPT

## 2018-12-20 NOTE — TELEPHONE ENCOUNTER
appt made for tomorrow, s/s sore throat today, coughing- productive-stated can hear wheezing until she cough up phlegm-advised UC/ER if s/s worsens  Reason for Disposition  • Fever present > 3 days (72 hours)    Protocols used: SORE THROAT-A-OH

## 2018-12-21 NOTE — ED INITIAL ASSESSMENT (HPI)
Cough since 1 pm, + sore throat, tactile fever, was seen by pmd today and was given tessalon and flonase

## 2018-12-21 NOTE — ED PROVIDER NOTES
Patient Seen in: [facility]    History   Patient presents with:  Cough/URI    Stated Complaint: cough    HPI  Patient states she had mild runny nose yesterday and started coughing today.   This afternoon she lost her voice °F (36.5 °C)   Temp src Oral   SpO2 99 %   O2 Device None (Room air)       Current:/60   Pulse 95   Temp 97.7 °F (36.5 °C) (Oral)   Resp 20   SpO2 99%         Physical Exam   Constitutional: She is oriented to person, place, and time.  She appears wel she is to go to the emergency department for further evaluation. All questions were answered prior to discharge.     Disposition and Plan     Clinical Impression:  Bronchitis  (primary encounter diagnosis)  Acute laryngitis    Disposition:  Discharge  12/2

## 2019-01-07 ENCOUNTER — TELEPHONE (OUTPATIENT)
Dept: INTERNAL MEDICINE CLINIC | Facility: CLINIC | Age: 64
End: 2019-01-07

## 2019-01-07 NOTE — TELEPHONE ENCOUNTER
Letter from patient received at [place]. \" Dr. Js Hampton, Please quit sending info about tests and correspondence because my new Dr, Elma Cerda, is quite aware of all my test results from Oct, [address], Dec of 2018. Please obey my wishes. \"    Unable to

## 2019-01-08 NOTE — TELEPHONE ENCOUNTER
Letter from patient received at [address] [place] [address] 402. \" Dr. Xander Lara, Please quit sending info about tests and correspondence because my new Dr, Juan Bowen, is quite aware of all my test results from Oct, [address], Dec of 2018. Please obey my wishes. \"     Unable

## 2019-01-08 NOTE — TELEPHONE ENCOUNTER
According to [facility] website  Joanne Hartman D.O.   [address]  [address] [place] [address]  Phone: [phone]  Fax: [phone]

## 2019-01-08 NOTE — TELEPHONE ENCOUNTER
Patient contacted, she confirmed her new PCP is Dr Irlanda Ingram.  She no longer wants to received phone messages or my Chart messages from Dr Tutu Rothman updated the PCP to Dr Dago Bullock

## 2019-03-06 PROBLEM — R19.5 POSITIVE COLORECTAL CANCER SCREENING USING COLOGUARD TEST: Status: ACTIVE | Noted: 2019-03-06

## 2019-03-18 PROCEDURE — 88305 TISSUE EXAM BY PATHOLOGIST: CPT | Performed by: INTERNAL MEDICINE

## 2022-06-03 ENCOUNTER — HOSPITAL ENCOUNTER (EMERGENCY)
Facility: HOSPITAL | Age: 67
Discharge: HOME | End: 2022-06-03
Attending: EMERGENCY MEDICINE
Payer: MEDICARE

## 2022-06-03 VITALS
SYSTOLIC BLOOD PRESSURE: 107 MMHG | WEIGHT: 143.06 LBS | RESPIRATION RATE: 18 BRPM | OXYGEN SATURATION: 98 % | TEMPERATURE: 98 F | DIASTOLIC BLOOD PRESSURE: 60 MMHG | BODY MASS INDEX: 26 KG/M2 | HEART RATE: 76 BPM

## 2022-06-03 DIAGNOSIS — R53.1 WEAKNESS GENERALIZED: Primary | ICD-10-CM

## 2022-06-03 LAB
ALBUMIN SERPL-MCNC: 4.2 G/DL (ref 3.4–5)
ALBUMIN/GLOB SERPL: 1.2 {RATIO} (ref 1–2)
ALP LIVER SERPL-CCNC: 104 U/L
ALT SERPL-CCNC: 31 U/L
ANION GAP SERPL CALC-SCNC: 4 MMOL/L (ref 0–18)
AST SERPL-CCNC: 25 U/L (ref 15–37)
BASOPHILS # BLD AUTO: 0.02 X10(3) UL (ref 0–0.2)
BASOPHILS NFR BLD AUTO: 0.6 %
BILIRUB SERPL-MCNC: 0.3 MG/DL (ref 0.1–2)
BILIRUB UR QL: NEGATIVE
BUN BLD-MCNC: 12 MG/DL (ref 7–18)
BUN/CREAT SERPL: 12 (ref 10–20)
CALCIUM BLD-MCNC: 9.8 MG/DL (ref 8.5–10.1)
CHLORIDE SERPL-SCNC: 109 MMOL/L (ref 98–112)
CLARITY UR: CLEAR
CO2 SERPL-SCNC: 28 MMOL/L (ref 21–32)
COLOR UR: YELLOW
CREAT BLD-MCNC: 1 MG/DL
DEPRECATED RDW RBC AUTO: 43.3 FL (ref 35.1–46.3)
EOSINOPHIL # BLD AUTO: 0.01 X10(3) UL (ref 0–0.7)
EOSINOPHIL NFR BLD AUTO: 0.3 %
ERYTHROCYTE [DISTWIDTH] IN BLOOD BY AUTOMATED COUNT: 12.4 % (ref 11–15)
GLOBULIN PLAS-MCNC: 3.4 G/DL (ref 2.8–4.4)
GLUCOSE BLD-MCNC: 99 MG/DL (ref 70–99)
GLUCOSE UR-MCNC: NEGATIVE MG/DL
HCT VFR BLD AUTO: 46.1 %
HGB BLD-MCNC: 15 G/DL
IMM GRANULOCYTES # BLD AUTO: 0.01 X10(3) UL (ref 0–1)
IMM GRANULOCYTES NFR BLD: 0.3 %
KETONES UR-MCNC: NEGATIVE MG/DL
LYMPHOCYTES # BLD AUTO: 0.89 X10(3) UL (ref 1–4)
LYMPHOCYTES NFR BLD AUTO: 26.7 %
MAGNESIUM SERPL-MCNC: 2.4 MG/DL (ref 1.6–2.6)
MCH RBC QN AUTO: 30.5 PG (ref 26–34)
MCHC RBC AUTO-ENTMCNC: 32.5 G/DL (ref 31–37)
MCV RBC AUTO: 93.7 FL
MONOCYTES # BLD AUTO: 0.31 X10(3) UL (ref 0.1–1)
MONOCYTES NFR BLD AUTO: 9.3 %
NEUTROPHILS # BLD AUTO: 2.09 X10 (3) UL (ref 1.5–7.7)
NEUTROPHILS # BLD AUTO: 2.09 X10(3) UL (ref 1.5–7.7)
NEUTROPHILS NFR BLD AUTO: 62.8 %
NITRITE UR QL STRIP.AUTO: NEGATIVE
OSMOLALITY SERPL CALC.SUM OF ELEC: 292 MOSM/KG (ref 275–295)
PH UR: 5.5 [PH] (ref 5–8)
PLATELET # BLD AUTO: 217 10(3)UL (ref 150–450)
POTASSIUM SERPL-SCNC: 3.7 MMOL/L (ref 3.5–5.1)
PROT SERPL-MCNC: 7.6 G/DL (ref 6.4–8.2)
PROT UR-MCNC: NEGATIVE MG/DL
RBC # BLD AUTO: 4.92 X10(6)UL
SARS-COV-2 RNA RESP QL NAA+PROBE: NOT DETECTED
SODIUM SERPL-SCNC: 141 MMOL/L (ref 136–145)
SP GR UR STRIP: 1.01 (ref 1–1.03)
UROBILINOGEN UR STRIP-ACNC: 0.2
WBC # BLD AUTO: 3.3 X10(3) UL (ref 4–11)

## 2022-06-03 PROCEDURE — 81001 URINALYSIS AUTO W/SCOPE: CPT | Performed by: EMERGENCY MEDICINE

## 2022-06-03 PROCEDURE — 36415 COLL VENOUS BLD VENIPUNCTURE: CPT

## 2022-06-03 PROCEDURE — 87086 URINE CULTURE/COLONY COUNT: CPT | Performed by: EMERGENCY MEDICINE

## 2022-06-03 PROCEDURE — 80053 COMPREHEN METABOLIC PANEL: CPT | Performed by: EMERGENCY MEDICINE

## 2022-06-03 PROCEDURE — 83735 ASSAY OF MAGNESIUM: CPT | Performed by: EMERGENCY MEDICINE

## 2022-06-03 PROCEDURE — 85025 COMPLETE CBC W/AUTO DIFF WBC: CPT | Performed by: EMERGENCY MEDICINE

## 2022-06-03 PROCEDURE — 99283 EMERGENCY DEPT VISIT LOW MDM: CPT

## 2022-06-03 NOTE — ED INITIAL ASSESSMENT (HPI)
PT suspects she is dehydrated, has had decrased appetite recently, as well as heavy laxative use to relieve constipation. Increasing weakness.

## 2022-06-26 PROBLEM — E87.6 HYPOKALEMIA: Status: ACTIVE | Noted: 2022-06-26

## 2022-06-26 NOTE — ED INITIAL ASSESSMENT (HPI)
Patient presents with tremors/ numbness all over since October. Notes it is worsening, patient has seen neurology for this.     Arrives from home via [facility] [place]

## 2022-06-27 PROBLEM — F41.9 ANXIETY WITH SOMATIZATION: Status: ACTIVE | Noted: 2022-06-27

## 2022-06-27 PROBLEM — F45.0 ANXIETY WITH SOMATIZATION: Status: ACTIVE | Noted: 2022-06-27

## 2022-06-27 PROBLEM — R45.851 SUICIDAL IDEATION: Status: ACTIVE | Noted: 2022-06-27

## 2022-06-27 PROBLEM — F31.5 BIPOLAR I DISORDER, MOST RECENT EPISODE DEPRESSED, SEVERE W PSYCHOSIS (HCC): Status: ACTIVE | Noted: 2022-06-27

## 2022-06-27 NOTE — ED QUICK NOTES
Pt attempted to stand up to go to bathroom with 2 assist, pt sts that she is too dizzy standing up and went back to bed, assisted in bed for comfort, pt on purewick at this time, refused to use bedpan when offered
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
## 2022-06-27 NOTE — ED PROVIDER NOTES
Patient endorsed to me by Dr Arcadio Frazier in setting of difficulty functioning at home. Patient requesting albuterol inhaler at 1 point during ED stay although no wheezing or respiratory distress on my assessment and then ultimately declined the inhaler. Patient also with urinary retention that was relieved by straight cath performed by RN. Patient had over 700 mL of urine in her bladder. Patient states that she has frequent issues with incontinence and inability to urinate secondary to \"her nerves. \"  She states that she is bedbound and has seen neurology and had multiple tests done and declines any further testing at this time. Awaiting psychiatric transfer. 6/28: still no issue on shift. Awaiting transfer/placement.

## 2022-06-27 NOTE — ED QUICK NOTES
Patient states that she has history of asthma and feels tight. Patient asks for inhaler. Lungs are clear, patient saturates 100% on RA.

## 2022-06-27 NOTE — ED NOTES
Received a call from [facility] stating that their unit is full. It was suggested to call back tomorrow if placement is still needed. Called [facility] and spoke with Camilla to make referral. Orlin Milan stated that their Business Office will need to review the [employer]. Referral Packet has been faxed over for review.

## 2022-06-27 NOTE — ED NOTES
Called the following Hospitals for in-patient placement:    [facility]- No beds available    [facility]- No beds available today. However, referral was made for review tomorrow. [facility]- Referral made over the phone. No beds available    [facility]- Left voice mail message. Referral Packet has been faxed for review    [facility]- No beds available    [facility]- No beds available     [facility]- Referral was made over the phone. Referral Packet has been faxed for review.

## 2022-06-27 NOTE — PROGRESS NOTES
Angel Rueda explained that she \"just sits on the toilet\" until she urinates at home. Does not self-cath. States difficulty with urination is related in her mind to the height of the toilet in their home, which is higher to accommodate her 's need after hip replacement. She also thinks it is related to a pinched nerve.

## 2022-06-27 NOTE — PROGRESS NOTES
06/27/22 1300   Current Living Situation   Current Living Situation Private Residence   S[facility] and [facility]   Is the patient verbal? Yes   Vision or hearing correction? Eye Glasses   Patient able to understand and follow directions? Yes   Patient able to express needs? Yes   Feeding and Swallowing   History of aspiration or choking? No   Feeding assistance needed? No   Patient have any chewing or swallowing problems? No   Special Diet No   Mobility/Activity & Assistive Devices   Current/recent injuries or surgeries that affect mobility? Yes (Comment)  (states previously broken foot is \"still weak\"; she is using a cane at home when walking)   Physical Limitations Present None  (Has been lying in bed much of each day; reports loss of muscle mass and strength; increase in episode of dizziness if rises too quickly)   Independent in ambulation? Yes   Transfer Assist Standby Assist   Assistive Device Used: Can   History of falls? Yes   When was the most recent fall? months ago; it was how she broke her foot   How often has the patient fallen in the last month? none   Grooming   Level of independence in dressing Requires set-up/cues   Level of independence to shower/bathe/wash Requires set-up/cues   Level of independence in personal grooming tasks (e.g., brushing teeth, brushing hair, applying hearing aids, shaving, etc.) Fully Independent   Toileting   Patient Incontinence None   Special Considerations   Patient has pressures sores, surgical wounds, bandages/dressings? No   Patient uses any kind of pump? No   Does the patient need a BiPAP or CPAP? No   Intellectual disability reported? Intellectual Disability?  No

## 2022-06-27 NOTE — ED QUICK NOTES
Patient states she is retaining urine. Bladder scan performed, read 988mL of urine. MD notified. Indwelling maria catheter order and placed. Patient aware of plan. One to one sitter at [facility].

## 2022-06-27 NOTE — ED QUICK NOTES
Assumed care of Rosita Coleman at this time. She remains awake/alert and oriented x4 and is calm/cooperative with staff. Patient with 1:1 sitter every 15 minute documentation per paper record, suicide precautions in place. Patient wearing hospital safety gown.

## 2022-06-28 NOTE — ED NOTES
Called back [facility] after being advised by Manjula Paris ED RN that she was told 10 a.m. for the transport. Spoke further with Stephanie at [facility]. She confirmed the time was changed to 10 a.m.

## 2022-06-28 NOTE — ED NOTES
Called the patient's , Svitlana Vega. Updated him regarding acceptance to [facility] and that she will be leaving around 10 a.m. Also provided him with directions on how to get to [facility].

## 2022-06-28 NOTE — ED PROVIDER NOTES
Patient signed out to me from previous physician, awaiting placement for difficulty coping at home due to her nerves. Patient calm and cooperative during my shift did not require any interventions.   Signed out to next provider awaiting placement

## 2022-06-28 NOTE — ED NOTES
Spoke to Joe with [facility] has been accepted for admission to [facility] under the care of Dr Deepthi Giron. [facility] requested that we set up transport for 12 p.m.

## 2022-06-28 NOTE — ED QUICK NOTES
Report received from [facility]. Patient rounded on at this time. Pt provided warm blanket. Denies any further needs at this time.

## 2022-06-28 NOTE — ED QUICK NOTES
Report given to [facility]; they will re-contact and notify if patient is accepted.

## 2022-08-03 ENCOUNTER — HOSPITAL ENCOUNTER (EMERGENCY)
Facility: HOSPITAL | Age: 67
Discharge: HOME | End: 2022-08-03
Attending: EMERGENCY MEDICINE
Payer: MEDICARE

## 2022-08-03 ENCOUNTER — APPOINTMENT (OUTPATIENT)
Dept: GENERAL RADIOLOGY | Facility: HOSPITAL | Age: 67
End: 2022-08-03
Attending: EMERGENCY MEDICINE
Payer: MEDICARE

## 2022-08-03 ENCOUNTER — APPOINTMENT (OUTPATIENT)
Dept: CT IMAGING | Facility: HOSPITAL | Age: 67
End: 2022-08-03
Attending: EMERGENCY MEDICINE
Payer: MEDICARE

## 2022-08-03 VITALS
RESPIRATION RATE: 14 BRPM | HEIGHT: 62 IN | TEMPERATURE: 99 F | HEART RATE: 82 BPM | BODY MASS INDEX: 23.92 KG/M2 | WEIGHT: 130 LBS | OXYGEN SATURATION: 97 % | SYSTOLIC BLOOD PRESSURE: 109 MMHG | DIASTOLIC BLOOD PRESSURE: 66 MMHG

## 2022-08-03 DIAGNOSIS — R13.10 DYSPHAGIA, UNSPECIFIED TYPE: ICD-10-CM

## 2022-08-03 DIAGNOSIS — G62.9 NEUROPATHY: Primary | ICD-10-CM

## 2022-08-03 LAB
ANION GAP SERPL CALC-SCNC: 5 MMOL/L (ref 0–18)
BASOPHILS # BLD AUTO: 0.02 X10(3) UL (ref 0–0.2)
BASOPHILS NFR BLD AUTO: 0.3 %
BUN BLD-MCNC: 10 MG/DL (ref 7–18)
BUN/CREAT SERPL: 10.6 (ref 10–20)
CALCIUM BLD-MCNC: 10.2 MG/DL (ref 8.5–10.1)
CHLORIDE SERPL-SCNC: 107 MMOL/L (ref 98–112)
CK SERPL-CCNC: 35 U/L
CO2 SERPL-SCNC: 28 MMOL/L (ref 21–32)
CREAT BLD-MCNC: 0.94 MG/DL
DEPRECATED RDW RBC AUTO: 45.9 FL (ref 35.1–46.3)
EOSINOPHIL # BLD AUTO: 0.01 X10(3) UL (ref 0–0.7)
EOSINOPHIL NFR BLD AUTO: 0.2 %
ERYTHROCYTE [DISTWIDTH] IN BLOOD BY AUTOMATED COUNT: 13.2 % (ref 11–15)
GFR SERPLBLD BASED ON 1.73 SQ M-ARVRAT: 66 ML/MIN/1.73M2 (ref 60–?)
GLUCOSE BLD-MCNC: 88 MG/DL (ref 70–99)
HCT VFR BLD AUTO: 47 %
HGB BLD-MCNC: 15.1 G/DL
IMM GRANULOCYTES # BLD AUTO: 0.03 X10(3) UL (ref 0–1)
IMM GRANULOCYTES NFR BLD: 0.5 %
INR BLD: 1.02 (ref 0.85–1.16)
LYMPHOCYTES # BLD AUTO: 1.21 X10(3) UL (ref 1–4)
LYMPHOCYTES NFR BLD AUTO: 21.1 %
MCH RBC QN AUTO: 30.3 PG (ref 26–34)
MCHC RBC AUTO-ENTMCNC: 32.1 G/DL (ref 31–37)
MCV RBC AUTO: 94.2 FL
MONOCYTES # BLD AUTO: 0.52 X10(3) UL (ref 0.1–1)
MONOCYTES NFR BLD AUTO: 9.1 %
NEUTROPHILS # BLD AUTO: 3.94 X10 (3) UL (ref 1.5–7.7)
NEUTROPHILS # BLD AUTO: 3.94 X10(3) UL (ref 1.5–7.7)
NEUTROPHILS NFR BLD AUTO: 68.8 %
NT-PROBNP SERPL-MCNC: 49 PG/ML (ref ?–125)
OSMOLALITY SERPL CALC.SUM OF ELEC: 288 MOSM/KG (ref 275–295)
PLATELET # BLD AUTO: 270 10(3)UL (ref 150–450)
POTASSIUM SERPL-SCNC: 3.9 MMOL/L (ref 3.5–5.1)
PROTHROMBIN TIME: 13.4 SECONDS (ref 11.6–14.8)
RBC # BLD AUTO: 4.99 X10(6)UL
SARS-COV-2 RNA RESP QL NAA+PROBE: NOT DETECTED
SODIUM SERPL-SCNC: 140 MMOL/L (ref 136–145)
TROPONIN I HIGH SENSITIVITY: <3 NG/L
WBC # BLD AUTO: 5.7 X10(3) UL (ref 4–11)

## 2022-08-03 PROCEDURE — 96374 THER/PROPH/DIAG INJ IV PUSH: CPT

## 2022-08-03 PROCEDURE — 93010 ELECTROCARDIOGRAM REPORT: CPT | Performed by: EMERGENCY MEDICINE

## 2022-08-03 PROCEDURE — 82550 ASSAY OF CK (CPK): CPT | Performed by: EMERGENCY MEDICINE

## 2022-08-03 PROCEDURE — 84484 ASSAY OF TROPONIN QUANT: CPT | Performed by: EMERGENCY MEDICINE

## 2022-08-03 PROCEDURE — 85610 PROTHROMBIN TIME: CPT | Performed by: EMERGENCY MEDICINE

## 2022-08-03 PROCEDURE — 93005 ELECTROCARDIOGRAM TRACING: CPT

## 2022-08-03 PROCEDURE — 71045 X-RAY EXAM CHEST 1 VIEW: CPT | Performed by: EMERGENCY MEDICINE

## 2022-08-03 PROCEDURE — 83880 ASSAY OF NATRIURETIC PEPTIDE: CPT | Performed by: EMERGENCY MEDICINE

## 2022-08-03 PROCEDURE — 99284 EMERGENCY DEPT VISIT MOD MDM: CPT

## 2022-08-03 PROCEDURE — 85025 COMPLETE CBC W/AUTO DIFF WBC: CPT | Performed by: EMERGENCY MEDICINE

## 2022-08-03 PROCEDURE — 96375 TX/PRO/DX INJ NEW DRUG ADDON: CPT

## 2022-08-03 PROCEDURE — 80048 BASIC METABOLIC PNL TOTAL CA: CPT | Performed by: EMERGENCY MEDICINE

## 2022-08-03 RX ORDER — METOCLOPRAMIDE HYDROCHLORIDE 5 MG/ML
10 INJECTION INTRAMUSCULAR; INTRAVENOUS ONCE
Status: COMPLETED | OUTPATIENT
Start: 2022-08-03 | End: 2022-08-03

## 2022-08-03 RX ORDER — DIPHENHYDRAMINE HYDROCHLORIDE 50 MG/ML
25 INJECTION INTRAMUSCULAR; INTRAVENOUS ONCE
Status: COMPLETED | OUTPATIENT
Start: 2022-08-03 | End: 2022-08-03

## 2022-08-03 NOTE — ED INITIAL ASSESSMENT (HPI)
Pt to Ed /w c/o sob, cp, and difficulty swallowing. Pt has had swallowing problems for awhile now but has no received a diagnosis. airway intact at time of triage.  Pt is axox4

## 2022-08-06 ENCOUNTER — APPOINTMENT (OUTPATIENT)
Dept: GENERAL RADIOLOGY | Facility: HOSPITAL | Age: 67
End: 2022-08-06
Attending: EMERGENCY MEDICINE
Payer: MEDICARE

## 2022-08-06 ENCOUNTER — HOSPITAL ENCOUNTER (EMERGENCY)
Facility: HOSPITAL | Age: 67
Discharge: NURSING, REHAB OR LONG-TERM CARE | End: 2022-08-07
Attending: EMERGENCY MEDICINE
Payer: MEDICARE

## 2022-08-06 DIAGNOSIS — F41.9 ANXIETY WITH SOMATIZATION: ICD-10-CM

## 2022-08-06 DIAGNOSIS — F45.0 ANXIETY WITH SOMATIZATION: ICD-10-CM

## 2022-08-06 DIAGNOSIS — R06.00 DYSPNEA, UNSPECIFIED TYPE: Primary | ICD-10-CM

## 2022-08-06 LAB
ALBUMIN SERPL-MCNC: 4.3 G/DL (ref 3.4–5)
ALP LIVER SERPL-CCNC: 103 U/L
ALT SERPL-CCNC: 27 U/L
AMPHET UR QL SCN: NEGATIVE
ANION GAP SERPL CALC-SCNC: 9 MMOL/L (ref 0–18)
APAP SERPL-MCNC: <2 UG/ML (ref 10–30)
AST SERPL-CCNC: 19 U/L (ref 15–37)
BARBITURATES UR QL SCN: NEGATIVE
BASOPHILS # BLD AUTO: 0.02 X10(3) UL (ref 0–0.2)
BASOPHILS NFR BLD AUTO: 0.3 %
BENZODIAZ UR QL SCN: NEGATIVE
BILIRUB DIRECT SERPL-MCNC: 0.2 MG/DL (ref 0–0.2)
BILIRUB SERPL-MCNC: 0.7 MG/DL (ref 0.1–2)
BUN BLD-MCNC: 8 MG/DL (ref 7–18)
BUN/CREAT SERPL: 8.5 (ref 10–20)
CALCIUM BLD-MCNC: 10 MG/DL (ref 8.5–10.1)
CANNABINOIDS UR QL SCN: NEGATIVE
CHLORIDE SERPL-SCNC: 105 MMOL/L (ref 98–112)
CO2 SERPL-SCNC: 24 MMOL/L (ref 21–32)
COCAINE UR QL: NEGATIVE
CREAT BLD-MCNC: 0.94 MG/DL
CREAT UR-SCNC: 18.5 MG/DL
D DIMER PPP FEU-MCNC: <0.27 UG/ML FEU (ref ?–0.68)
DEPRECATED RDW RBC AUTO: 45 FL (ref 35.1–46.3)
EOSINOPHIL # BLD AUTO: 0.01 X10(3) UL (ref 0–0.7)
EOSINOPHIL NFR BLD AUTO: 0.2 %
ERYTHROCYTE [DISTWIDTH] IN BLOOD BY AUTOMATED COUNT: 13.1 % (ref 11–15)
ETHANOL SERPL-MCNC: <3 MG/DL (ref ?–3)
FLUAV + FLUBV RNA SPEC NAA+PROBE: NEGATIVE
FLUAV + FLUBV RNA SPEC NAA+PROBE: NEGATIVE
GFR SERPLBLD BASED ON 1.73 SQ M-ARVRAT: 66 ML/MIN/1.73M2 (ref 60–?)
GLUCOSE BLD-MCNC: 94 MG/DL (ref 70–99)
GLUCOSE BLDC GLUCOMTR-MCNC: 96 MG/DL (ref 70–99)
HCT VFR BLD AUTO: 46.4 %
HGB BLD-MCNC: 14.9 G/DL
IMM GRANULOCYTES # BLD AUTO: 0.02 X10(3) UL (ref 0–1)
IMM GRANULOCYTES NFR BLD: 0.3 %
LYMPHOCYTES # BLD AUTO: 2.22 X10(3) UL (ref 1–4)
LYMPHOCYTES NFR BLD AUTO: 35.4 %
MCH RBC QN AUTO: 30.1 PG (ref 26–34)
MCHC RBC AUTO-ENTMCNC: 32.1 G/DL (ref 31–37)
MCV RBC AUTO: 93.7 FL
MDMA UR QL SCN: NEGATIVE
METHADONE UR QL SCN: NEGATIVE
MONOCYTES # BLD AUTO: 0.61 X10(3) UL (ref 0.1–1)
MONOCYTES NFR BLD AUTO: 9.7 %
NEUTROPHILS # BLD AUTO: 3.4 X10 (3) UL (ref 1.5–7.7)
NEUTROPHILS # BLD AUTO: 3.4 X10(3) UL (ref 1.5–7.7)
NEUTROPHILS NFR BLD AUTO: 54.1 %
OPIATES UR QL SCN: NEGATIVE
OSMOLALITY SERPL CALC.SUM OF ELEC: 284 MOSM/KG (ref 275–295)
OXYCODONE UR QL SCN: NEGATIVE
PCP UR QL SCN: NEGATIVE
PLATELET # BLD AUTO: 286 10(3)UL (ref 150–450)
POTASSIUM SERPL-SCNC: 3.4 MMOL/L (ref 3.5–5.1)
PROT SERPL-MCNC: 7.8 G/DL (ref 6.4–8.2)
RBC # BLD AUTO: 4.95 X10(6)UL
RSV RNA SPEC NAA+PROBE: NEGATIVE
SALICYLATES SERPL-MCNC: <1.7 MG/DL (ref 2.8–20)
SARS-COV-2 RNA RESP QL NAA+PROBE: NOT DETECTED
SODIUM SERPL-SCNC: 138 MMOL/L (ref 136–145)
TROPONIN I HIGH SENSITIVITY: 3 NG/L
WBC # BLD AUTO: 6.3 X10(3) UL (ref 4–11)

## 2022-08-06 PROCEDURE — 71045 X-RAY EXAM CHEST 1 VIEW: CPT | Performed by: EMERGENCY MEDICINE

## 2022-08-06 RX ORDER — CLONAZEPAM 1 MG/1
1 TABLET ORAL NIGHTLY
Status: DISCONTINUED | OUTPATIENT
Start: 2022-08-06 | End: 2022-08-07

## 2022-08-06 RX ORDER — LORAZEPAM 1 MG/1
1 TABLET ORAL ONCE
Status: DISCONTINUED | OUTPATIENT
Start: 2022-08-06 | End: 2022-08-06

## 2022-08-06 RX ORDER — ACETAMINOPHEN 500 MG
1000 TABLET ORAL NIGHTLY
Status: DISCONTINUED | OUTPATIENT
Start: 2022-08-06 | End: 2022-08-07

## 2022-08-06 RX ORDER — ACETAMINOPHEN 500 MG
1000 TABLET ORAL NIGHTLY
COMMUNITY

## 2022-08-06 RX ORDER — DIPHENHYDRAMINE HYDROCHLORIDE 50 MG/ML
25 INJECTION INTRAMUSCULAR; INTRAVENOUS ONCE
Status: COMPLETED | OUTPATIENT
Start: 2022-08-06 | End: 2022-08-06

## 2022-08-06 RX ORDER — LEVOTHYROXINE SODIUM 0.05 MG/1
50 TABLET ORAL
Status: DISCONTINUED | OUTPATIENT
Start: 2022-08-07 | End: 2022-08-07

## 2022-08-06 RX ORDER — QUETIAPINE FUMARATE 200 MG/1
200 TABLET, FILM COATED ORAL NIGHTLY
Status: DISCONTINUED | OUTPATIENT
Start: 2022-08-06 | End: 2022-08-07

## 2022-08-06 NOTE — ED INITIAL ASSESSMENT (HPI)
Pt c/o difficulty breathing that began today. Pt was tripoding when EMS arrived and is feeling unable to take a deep breath.

## 2022-08-07 VITALS
HEIGHT: 62 IN | DIASTOLIC BLOOD PRESSURE: 58 MMHG | TEMPERATURE: 97 F | OXYGEN SATURATION: 98 % | WEIGHT: 125 LBS | SYSTOLIC BLOOD PRESSURE: 118 MMHG | RESPIRATION RATE: 16 BRPM | HEART RATE: 87 BPM | BODY MASS INDEX: 23 KG/M2

## 2022-08-07 PROBLEM — F41.1 GENERALIZED ANXIETY DISORDER WITH PANIC ATTACKS: Status: ACTIVE | Noted: 2022-08-07

## 2022-08-07 PROBLEM — F41.0 GENERALIZED ANXIETY DISORDER WITH PANIC ATTACKS: Status: ACTIVE | Noted: 2022-08-07

## 2022-08-07 PROCEDURE — 90792 PSYCH DIAG EVAL W/MED SRVCS: CPT | Performed by: OTHER

## 2022-08-07 RX ORDER — LITHIUM CARBONATE 300 MG/1
300 TABLET, FILM COATED, EXTENDED RELEASE ORAL 2 TIMES DAILY WITH MEALS
Status: DISCONTINUED | OUTPATIENT
Start: 2022-08-07 | End: 2022-08-07

## 2022-08-07 RX ORDER — OLANZAPINE 5 MG/1
5 TABLET ORAL NIGHTLY
Status: DISCONTINUED | OUTPATIENT
Start: 2022-08-07 | End: 2022-08-07

## 2022-08-07 NOTE — ED NOTES
Patient reports she never uses her nebulizer and does not feel its needed. Per , patient hasn't used her nebulizer in years.

## 2022-08-07 NOTE — ED NOTES
Patient deflected by [facility] due to nebulizer showing as an active order. Referral re-activated through [facility].

## 2022-08-07 NOTE — ED PROVIDER NOTES
Received signout. 16-year-old female with anxiety, depression, not caring for self. Previously medically cleared. Pending transfer to psychiatric hospitalization.

## 2022-08-07 NOTE — ED QUICK NOTES
Med req done with pt. She reports she does not take most of her medication due to inability to swallow them. Pt requesting seroquel, clonazepam and tylenol tonight. Orders received per pt's home med schedule & dosing.

## 2022-08-07 NOTE — ED NOTES
[facility], [facility], [facility] does not have any valeriano beds. [facility] unable to accomodate due to nebulizer tx.

## 2022-08-07 NOTE — ED QUICK NOTES
[facility] Rounding Completed. Plan of Care reviewed. Waiting for inpatient placement. Elimination needs assessed. Bed is locked and in lowest position. Call light within reach. Sitter at bedside.

## 2022-08-07 NOTE — ED NOTES
Updated pt and  on POC. Pt requests [facility] or [facility].  She will not agree to any other hospital.

## 2022-08-07 NOTE — CERTIFICATION
Ref: [facility] 5/3-403, 5/3-602, 5/3-607, 5/3-610    5/3-702, 5/3-813, 5/4-306, 5/4-402, 5/4-403    5/4-405, 5/4-501, 5/4-611, 1/8-185   Inpatient Certificate  Re: Dalton Pacheco    (name)     I personally informed the above-named individual of the purpose of this examination and that he or she did not have to speak to me, and that any statements made might be related in court as to the individual's clinical condition or need for services. Additionally, if this examination was for the purpose of determining that the above-named individual is developmentally disabled and dangerous, I informed the individual of his or her right to speak with a relative, friend or  before the examination, and of his or her right to have an  appointed for him or her if he or she so desired.      Electronically signed by Michi Gomez MD    Signature of Examiner     On  8/7 , 2022 , at  9: 35  [x] a.m. [] p.m.,  I personally examined the    (date)  (year)  (time)    above-named individual. The examination was conducted at [facility].  Based on the foregoing examination, it is my opinion that he or she is:  []  A person with mental illness who, because of his or her illness is reasonably expected, unless treated on an inpatient basis, to engage in conduct placing such person or another in physical harm or in reasonable expectation of being physically harmed;   [x]  A person with mental illness who, because of his or her illness is unable to provide for his or her basic physical needs so as to guard himself or herself from serious harm, without the assistance of family or others, unless treated on an inpatient basis;   []  A person with mental illness who: refuses treatment or is not adhering adequately to prescribed treatment; because of the nature of his or her illness is unable to understand his or her need for treatment; and if not treated on an inpatient basis, is reasonably expected based on his or her behavioral history, to suffer mental or emotional deterioration and is reasonably expected, after such deterioration, to meet the criteria of either paragraph one or paragraph two above;   []  An individual who is developmentally disabled and unless treated on an in-patient basis is reasonably expected to inflict serious physical harm upon himself or herself or others in the near future, and/or   [x]  Is in need of immediate hospitalization for the prevention of such harm. I base my opinion on the following (including clinical observations, factual information):  I examined the patient in person. The patient with history of bipolar disorder with recent episode of severe depression, anxiety, somatization with hopelessness, helplessness and impairment in her ability to care for self.   Patient indicated the patient should for safety and stabilization  I believe that the individual is subject to: []  Involuntary inpatient admission and is not in need of immediate hospitalization   (check one) [x]  Involuntary inpatient admission and is in need of immediate hospitalization    []  Judicial inpatient admission and is not in need of immediate hospitalization    []  Judicial inpatient admission and is in need of immediate hospitalization     Date: 8/7/2022 Signature: Electronically signed by Brenden Kay MD   Title: MD Printed Name: Hunter Singhmaaugustin 35 [phone] ([birth year]) Inpatient Certificate    Printed by [employer]

## 2022-08-07 NOTE — BH LEVEL OF CARE ASSESSMENT
Crisis Evaluation Assessment    Patsy Ontiveros YOB: 1955   Age 75year old MRN [id number]   Location [address] Attending Dary Mujica MD      Patient's legal sex: female  Patient identifies as: female  Patient's birth sex: female  Preferred pronouns: she hers    Date of Service: 8/6/2022    Referral Source:  Referral Source  Referral Source: Friend/Relative  Referral Source Info:      Reason for Crisis Evaluation   Patient was brought to the ED via EMS for an anxiety attack for the second time in two days. Patient was having difficulty breathing and unable to take a deep breath. Patient was speaking rapidly, had numbness, tingling all over her body. Patient is feeling tremulous, unable to eat for fear of choking (all medical concerns have been ruled out). Patient states she has had trouble swallowing for some time now but has not been able to determine any medical cause. Patient reported to the MD she felt similarly to when she was psychiatrically hospitalized in June 2022. Patient reports she was discharged from [facility] after a couple of days inpatient. Patient states they added Hydroxyzine to the Klonopin and Seroquel but she stopped taking it right away because it made her feel \"drunk. \" With the agreement of her PCP and outpatient psychiatrist, Dr Ijeoma Cortes, she has been weaning off of Gabapentin. Patient's last day of taking Gabapentin was two days ago. Since discharge from [facility] patient has remained unable to get out of bed. Patient is not able to sleep well and is not eating. Patient reports her last meal was yesterday at breakfast. Patient denies SI but is hopeless and helpless. Patient's  and psychiatrist state they are concerned about pt's continued decompensation. Patient became severely depressed with the loss of her brother in May 2022. Per previous assessment, pt reports she wishes she could choke on food and die or waste away in a nursing home. Shortly after her brother's death pt stopped taking her Wellbutrin along with her Lithium which she had been on for many years. Patient reported she was attempting to titrate her Klonopin and Seroquel. Presently pt states she is taking 1 mg of Klonopin and 200 mg of Seroquel nightly. Patient's  continues to be very concerned about her wellbeing. Patient refuses to get out of bed and has not been eating or drinking anything. Patient quickly decompensated after her brother passed.  states he does not understand why patient is weaned herself off of medications including Lithium, he feels anytime he tries to express his concerns to the psychiatrist patient becomes very upset with him.  reports patient is not sleeping and is very anxious about every sound that occurs in the home. Patient's  states she called him 150x in the past 3 days and becomes very anxious when he leaves the home. Per previous assessment: Patient states she has seen many MD's for these symptoms but every test has been normal and they have been unable to determine a medical cause for these symptoms. Patient lost her brother three weeks ago and she states she feels as though she has given up. \"He was my best friend, even more than my . We talked every day all the time. He had Schizoaffective Disorder, he was worse than me. I was the healthiest of my siblings believe it or not! I can't eat, I give up and don't feel like living anymore. Collateral  Dr Tye Banks, psychiatrist. [phone]  , [employer], [phone]   *patient has given verbal permission to share information. Risk to Self or Others  Patient is a high risk of harm to herself. Suicide Risk Assessments:    Source of information for CSSR: Patient  In what setting is the screener performed?: in person  1. Have you wished you were dead or wished you could go to sleep and not wake up? (past 30 days): No  2.  Have you actually had any thoughts of killing yourself? (past 30 days): No  3. Have you been thinking about how you might kill yourself? (past 30 days): No  4. Have you had these thoughts and had some intention of acting on them? (past 30 days): No  5a. Have you started to work out or worked out the details of how to kill yourself? (past 30 days): No  5b. Do you intend to carry out this plan? (past 30 days): No  6. Have you ever done anything, started to do anything, or prepared to do anything to end your life? (lifetime): Yes  7. How long ago did you do any of these?: Within the last three months  Score -  OV: 3- Low Risk   Describe : patient reports feeling suicidal in June 2022. Is your experience of thoughts of dying by suicide:  (denies present SI.)  Protective Factors: doesn't want to end her life. Past Suicidal Ideation: Ideation  Describe: patient felt suicidal in June 2022. Family History or Personal Lived Experience of Loss or Near Loss by Suicide: Yes   Describe loss(es): patient has a significant history of SI in her family. Non-Suicidal Self-Injury:   denies    Access to Means:  Access to Means  Has access to means to attempt suicide or harm others or property: No  Description of Access: patient denies present SI. Discussion of Removal of Access to Means: upon discharge  Access to Firearm/Weapon: No  Discussion of Removal of Firearm/Weapon: upon discharge  Do you have a firearm owner ID card?: No  Collateral for any access to means/firearms/weapons:     Protective Factors:   Protective Factors: doesn't want to end her life. Review of Psychiatric Systems:  See above. Substance Use:  none    Functional Achievement:   Patient's functioning is poor. Current Treatment and Treatment History:  See above. Relevant Social History:  Patient is  and resides with her . Patient retired at age 54 due to her bipolar disorder. She had been a Hospice RN. Patient has no children.     [place] and Complex (as applicable):  Geriatric Functioning  Dementia Symptoms Observed/Expressed: No  Current Living Situation  Current Living Situation: Private Residence  [place] [place]  Is the patient verbal?: Yes  Vision or hearing correction?: Eye Glasses  Patient able to understand and follow directions?: Yes  Patient able to express needs?: Yes  Feeding and Swallowing  History of aspiration or choking?: No  Feeding assistance needed?: Requires set-up  Patient have any chewing or swallowing problems?: No  Special Diet: Soft/Mechanical Chopped  Mobility/Activity & Assistive Devices  Current/recent injuries or surgeries that affect mobility?: No  Physical Limitations Present: Other (pt is very weak due to loss of muscle mass and overall strength. Patient also becomes dizzy.)  Independent in ambulation?: Yes  Transfer Assist: Standby Assist  Assistive Device Used[de-identified] Can  History of falls?: Yes  When was the most recent fall?: months ago  How often has the patient fallen in the last month?: none  Grooming  Level of independence in dressing: Requires set-up/cues  Level of independence to shower/bathe/wash: Requires set-up/cues  Level of independence in personal grooming tasks (e.g., brushing teeth, brushing hair, applying hearing aids, shaving, etc.): Fully Independent  Toileting  Patient Incontinence: None  Special Considerations  Patient has pressures sores, surgical wounds, bandages/dressings?: No  Patient uses any kind of pump?: No  Does the patient need a BiPAP or CPAP?: No  Intellectual disability reported? Intellectual Disability?: No  Reported Social/Emotional Functioning Level  Describe: patient is very preoccupied with her medical condition leading to her decrease in functioning.       EDP Assessment (as applicable):  IBW Calculations  Weight: 125 lb  BMI (Calculated): 22.9  IBW LBS [place]: 110 LBS  IBW %: 113.64 %  IBW + 10%: 121 LBS  IBW - 10%: 99 LBS     Abuse Assessment:  Abuse Assessment  Physical Abuse: Denies  Verbal Abuse: Denies  Sexual Abuse: Denies  Neglect: Denies  Does anyone say or do something to you that makes you feel unsafe?: No  Have You Ever Been Harmed by a Partner/Caregiver?: No  Health Concerns r/t Abuse: No  Possible Abuse Reportable to[de-identified] Not appropriate for reporting to authorities    Mental Status Exam:   General Appearance  Characteristics: Disheveled;Poor hygiene  Eye Contact: Indirect  Psychomotor Behavior  Gait/Movement: Slow  Abnormal movements: Hand wringing  Posture: Slouched  Rate of Movement: Slow  Mood and Affect  Mood or Feelings: Sadness;Stressed; Anxious;Depressed; Hopeless  Anxiety Level- [place]AG[place] only: Moderate  Appropriateness of Affect: Congruent to mood  Range of Affect: Blunted  Stability of Affect: Stable  Attitude toward staff: Co-operative; Withdrawn  Speech  Rate of Speech: Slow  Flow of Speech: Appropriate  Intensity of Volume: Soft  Clarity: Clear  Cognition  Concentration: Unimpaired  Memory: Recent memory intact; Remote memory intact  Orientation Level: Oriented X4  Insight: Poor  Fair/poor insight as evidenced by: evidenced by symptoms and behaviors  Judgment: Poor  Fair/poor judgment as evidenced by: evidenced by symptoms and behaviors  Thought Patterns  Clarity/Relevance: Coherent;Logical  Flow: Organized; Perseveration  Content: Somatization  Level of Consciousness: Alert  Level of Consciousness: Alert  Behavior  Exhibited behavior: Participated      Disposition:  Inpatient     Assessment Summary:   Patient is a 75year old female who was brought to the ED for tingling, numbness and weakness. Pt admitted she was feeling hopeless and helpless. Patient lost her brother in May whom she was very close to. Since her brother's passing patient has not been eating or drinking very little. Patient has a history of bipolar disorder and was weaning off of her medications. Patient has had a history of multiple inpatient hospitalizations. Patient denies HI and psychosis. Risk/Protective Factors  Protective Factors: doesn't want to end her life. Level of Care Recommendations  Consulted with: Dr Luigi Parsons  Level of Care Recommendation: Inpatient Acute Care  Unit: [facility]  Reason for Unit Assigned: age and symptoms  Inpatient Criteria: 24 hr behavior monitoring;Severely decreased function; Inability to care for self; Failure at lowest level of care  Behavioral Precautions: Close Observation  Medical Precautions: Fall  Refused Treatment: No  Sign-In  Patient Verbalized Understanding: Yes        Diagnoses:  Primary Psychiatric Diagnosis  F31.4 Bipolar Disorder, Depressed, without Psychosis, Severe.      Secondary Psychiatric Diagnoses  n/a  Pervasive Diagnoses  n/a   Pertinent Non-Psychiatric Diagnoses  n/a         Robyn Lino LCSW

## 2022-08-07 NOTE — ED PROVIDER NOTES
Patient endorsed to me by Dr. Hever Barrios for continuation of care. Patient is awaiting inpatient psychiatric transfer for uncontrolled anxiety. She is prescribed an inhaler, but does not use it. Patient is medically cleared for inpatient psychiatric transfer.

## 2022-09-17 ENCOUNTER — HOSPITAL ENCOUNTER (OUTPATIENT)
Age: 67
Discharge: HOME | End: 2022-09-17
Attending: EMERGENCY MEDICINE

## 2022-09-17 VITALS
DIASTOLIC BLOOD PRESSURE: 67 MMHG | SYSTOLIC BLOOD PRESSURE: 116 MMHG | HEART RATE: 78 BPM | OXYGEN SATURATION: 97 % | TEMPERATURE: 98 F | RESPIRATION RATE: 20 BRPM

## 2022-09-17 DIAGNOSIS — J01.01 ACUTE RECURRENT MAXILLARY SINUSITIS: Primary | ICD-10-CM

## 2022-09-17 DIAGNOSIS — Z20.822 LAB TEST NEGATIVE FOR COVID-19 VIRUS: ICD-10-CM

## 2022-09-17 LAB — SARS-COV-2 RNA RESP QL NAA+PROBE: NOT DETECTED

## 2022-09-17 PROCEDURE — 99213 OFFICE O/P EST LOW 20 MIN: CPT

## 2022-09-17 PROCEDURE — 99214 OFFICE O/P EST MOD 30 MIN: CPT

## 2022-09-17 PROCEDURE — 93005 ELECTROCARDIOGRAM TRACING: CPT

## 2022-09-17 PROCEDURE — 93010 ELECTROCARDIOGRAM REPORT: CPT | Performed by: EMERGENCY MEDICINE

## 2022-09-17 RX ORDER — DOXYCYCLINE HYCLATE 100 MG/1
100 CAPSULE ORAL 2 TIMES DAILY
Qty: 14 CAPSULE | Refills: 0 | Status: SHIPPED | OUTPATIENT
Start: 2022-09-17 | End: 2022-09-24

## 2022-09-17 NOTE — ED INITIAL ASSESSMENT (HPI)
Presents with palpitations and \"pounding\" sensation in chest with dizziness for several days. Congestion and cough for 3 days. No fever.

## 2023-01-31 ENCOUNTER — OFFICE VISIT (OUTPATIENT)
Dept: OBGYN CLINIC | Facility: CLINIC | Age: 68
End: 2023-01-31
Payer: MEDICARE

## 2023-01-31 VITALS
HEART RATE: 101 BPM | BODY MASS INDEX: 28.17 KG/M2 | WEIGHT: 157 LBS | DIASTOLIC BLOOD PRESSURE: 78 MMHG | HEIGHT: 62.5 IN | SYSTOLIC BLOOD PRESSURE: 126 MMHG

## 2023-01-31 DIAGNOSIS — Z12.31 ENCOUNTER FOR SCREENING MAMMOGRAM FOR MALIGNANT NEOPLASM OF BREAST: ICD-10-CM

## 2023-01-31 DIAGNOSIS — Z01.419 WELL WOMAN EXAM WITH ROUTINE GYNECOLOGICAL EXAM: Primary | ICD-10-CM

## 2023-03-12 ENCOUNTER — HOSPITAL ENCOUNTER (INPATIENT)
Facility: HOSPITAL | Age: 68
LOS: 2 days | Discharge: HOME | End: 2023-03-14
Attending: EMERGENCY MEDICINE | Admitting: HOSPITALIST
Payer: MEDICARE

## 2023-03-12 ENCOUNTER — APPOINTMENT (OUTPATIENT)
Dept: CT IMAGING | Facility: HOSPITAL | Age: 68
End: 2023-03-12
Attending: EMERGENCY MEDICINE
Payer: MEDICARE

## 2023-03-12 DIAGNOSIS — F32.A DEPRESSION, UNSPECIFIED DEPRESSION TYPE: ICD-10-CM

## 2023-03-12 DIAGNOSIS — G40.909 SEIZURE DISORDER (HCC): Primary | ICD-10-CM

## 2023-03-12 LAB
ALBUMIN SERPL-MCNC: 4.1 G/DL (ref 3.4–5)
ALP LIVER SERPL-CCNC: 134 U/L
ALT SERPL-CCNC: 67 U/L
ANION GAP SERPL CALC-SCNC: 14 MMOL/L (ref 0–18)
AST SERPL-CCNC: 39 U/L (ref 15–37)
ATRIAL RATE: 96 BPM
BASOPHILS # BLD AUTO: 0.06 X10(3) UL (ref 0–0.2)
BASOPHILS NFR BLD AUTO: 0.6 %
BILIRUB DIRECT SERPL-MCNC: 0.1 MG/DL (ref 0–0.2)
BILIRUB SERPL-MCNC: 0.4 MG/DL (ref 0.1–2)
BILIRUB UR QL: NEGATIVE
BUN BLD-MCNC: 21 MG/DL (ref 7–18)
BUN/CREAT SERPL: 18.1 (ref 10–20)
CALCIUM BLD-MCNC: 9.6 MG/DL (ref 8.5–10.1)
CHLORIDE SERPL-SCNC: 107 MMOL/L (ref 98–112)
CLARITY UR: CLEAR
CO2 SERPL-SCNC: 18 MMOL/L (ref 21–32)
COLOR UR: YELLOW
CREAT BLD-MCNC: 1.16 MG/DL
DEPRECATED RDW RBC AUTO: 47.2 FL (ref 35.1–46.3)
EOSINOPHIL # BLD AUTO: 0.04 X10(3) UL (ref 0–0.7)
EOSINOPHIL NFR BLD AUTO: 0.4 %
ERYTHROCYTE [DISTWIDTH] IN BLOOD BY AUTOMATED COUNT: 14 % (ref 11–15)
GFR SERPLBLD BASED ON 1.73 SQ M-ARVRAT: 51 ML/MIN/1.73M2 (ref 60–?)
GLUCOSE BLD-MCNC: 95 MG/DL (ref 70–99)
GLUCOSE BLDC GLUCOMTR-MCNC: 83 MG/DL (ref 70–99)
GLUCOSE UR-MCNC: NEGATIVE MG/DL
GRAN CASTS #/AREA URNS LPF: PRESENT /LPF
HCT VFR BLD AUTO: 46.1 %
HGB BLD-MCNC: 15.3 G/DL
HGB UR QL STRIP.AUTO: NEGATIVE
HYALINE CASTS #/AREA URNS AUTO: PRESENT /LPF
IMM GRANULOCYTES # BLD AUTO: 0.11 X10(3) UL (ref 0–1)
IMM GRANULOCYTES NFR BLD: 1.2 %
KETONES UR-MCNC: NEGATIVE MG/DL
LEUKOCYTE ESTERASE UR QL STRIP.AUTO: NEGATIVE
LIPASE SERPL-CCNC: 165 U/L (ref 73–393)
LIPASE SERPL-CCNC: 41 U/L (ref 13–75)
LYMPHOCYTES # BLD AUTO: 3.6 X10(3) UL (ref 1–4)
LYMPHOCYTES NFR BLD AUTO: 38.1 %
MCH RBC QN AUTO: 30.4 PG (ref 26–34)
MCHC RBC AUTO-ENTMCNC: 33.2 G/DL (ref 31–37)
MCV RBC AUTO: 91.5 FL
MONOCYTES # BLD AUTO: 0.7 X10(3) UL (ref 0.1–1)
MONOCYTES NFR BLD AUTO: 7.4 %
NEUTROPHILS # BLD AUTO: 4.94 X10 (3) UL (ref 1.5–7.7)
NEUTROPHILS # BLD AUTO: 4.94 X10(3) UL (ref 1.5–7.7)
NEUTROPHILS NFR BLD AUTO: 52.3 %
NITRITE UR QL STRIP.AUTO: NEGATIVE
OSMOLALITY SERPL CALC.SUM OF ELEC: 291 MOSM/KG (ref 275–295)
P AXIS: 47 DEGREES
P-R INTERVAL: 190 MS
PH UR: 5 [PH] (ref 5–8)
PLATELET # BLD AUTO: 321 10(3)UL (ref 150–450)
POTASSIUM SERPL-SCNC: 4.2 MMOL/L (ref 3.5–5.1)
PROT SERPL-MCNC: 8.1 G/DL (ref 6.4–8.2)
PROT UR-MCNC: 30 MG/DL
Q-T INTERVAL: 364 MS
QRS DURATION: 80 MS
QTC CALCULATION (BEZET): 459 MS
R AXIS: 4 DEGREES
RBC # BLD AUTO: 5.04 X10(6)UL
SARS-COV-2 RNA RESP QL NAA+PROBE: NOT DETECTED
SODIUM SERPL-SCNC: 139 MMOL/L (ref 136–145)
SP GR UR STRIP: 1.01 (ref 1–1.03)
T AXIS: 69 DEGREES
UROBILINOGEN UR STRIP-ACNC: <2
VENTRICULAR RATE: 96 BPM
VIT C UR-MCNC: NEGATIVE MG/DL
WBC # BLD AUTO: 9.5 X10(3) UL (ref 4–11)

## 2023-03-12 PROCEDURE — 72125 CT NECK SPINE W/O DYE: CPT | Performed by: EMERGENCY MEDICINE

## 2023-03-12 PROCEDURE — 70450 CT HEAD/BRAIN W/O DYE: CPT | Performed by: EMERGENCY MEDICINE

## 2023-03-12 RX ORDER — GABAPENTIN 400 MG/1
400 CAPSULE ORAL DAILY
COMMUNITY

## 2023-03-12 RX ORDER — ONDANSETRON 2 MG/ML
4 INJECTION INTRAMUSCULAR; INTRAVENOUS EVERY 6 HOURS PRN
Status: DISCONTINUED | OUTPATIENT
Start: 2023-03-12 | End: 2023-03-14

## 2023-03-12 RX ORDER — ALBUTEROL SULFATE 90 UG/1
2 AEROSOL, METERED RESPIRATORY (INHALATION) EVERY 4 HOURS PRN
Status: DISCONTINUED | OUTPATIENT
Start: 2023-03-12 | End: 2023-03-14

## 2023-03-12 RX ORDER — LACOSAMIDE 100 MG/1
100 TABLET ORAL EVERY 12 HOURS
Status: DISCONTINUED | OUTPATIENT
Start: 2023-03-13 | End: 2023-03-14

## 2023-03-12 RX ORDER — POLYETHYLENE GLYCOL 3350 17 G/17G
17 POWDER, FOR SOLUTION ORAL DAILY PRN
Status: DISCONTINUED | OUTPATIENT
Start: 2023-03-12 | End: 2023-03-14

## 2023-03-12 RX ORDER — SENNOSIDES 8.6 MG
17.2 TABLET ORAL NIGHTLY PRN
Status: DISCONTINUED | OUTPATIENT
Start: 2023-03-12 | End: 2023-03-14

## 2023-03-12 RX ORDER — GABAPENTIN 400 MG/1
400 CAPSULE ORAL DAILY
Status: DISCONTINUED | OUTPATIENT
Start: 2023-03-12 | End: 2023-03-14

## 2023-03-12 RX ORDER — ACETAMINOPHEN 500 MG
500 TABLET ORAL EVERY 4 HOURS PRN
Status: DISCONTINUED | OUTPATIENT
Start: 2023-03-12 | End: 2023-03-14

## 2023-03-12 RX ORDER — DILTIAZEM HYDROCHLORIDE 120 MG/1
240 CAPSULE, EXTENDED RELEASE ORAL DAILY
Status: DISCONTINUED | OUTPATIENT
Start: 2023-03-12 | End: 2023-03-14

## 2023-03-12 RX ORDER — DILTIAZEM HYDROCHLORIDE 240 MG/1
240 CAPSULE, EXTENDED RELEASE ORAL DAILY
COMMUNITY

## 2023-03-12 RX ORDER — LEVOTHYROXINE SODIUM 0.05 MG/1
50 TABLET ORAL
Status: DISCONTINUED | OUTPATIENT
Start: 2023-03-13 | End: 2023-03-14

## 2023-03-12 RX ORDER — BISACODYL 10 MG
10 SUPPOSITORY, RECTAL RECTAL
Status: DISCONTINUED | OUTPATIENT
Start: 2023-03-12 | End: 2023-03-14

## 2023-03-12 RX ORDER — TOPIRAMATE 25 MG/1
25 TABLET ORAL EVERY 12 HOURS SCHEDULED
Status: CANCELLED | OUTPATIENT
Start: 2023-03-12

## 2023-03-12 NOTE — ED QUICK NOTES
Pt now reporting to MD that she \"has been feeling suicidal again\". Unable to elaborate, denies attempts or having a plan. Reports \"racing, repetitive thoughts\".

## 2023-03-12 NOTE — PLAN OF CARE
Problem: Patient Centered Care  Goal: Patient preferences are identified and integrated in the patient's plan of care  Description: Interventions:  - What would you like us to know as we care for you? I live with my   - Provide timely, complete, and accurate information to patient/family  - Incorporate patient and family knowledge, values, beliefs, and cultural backgrounds into the planning and delivery of care  - Encourage patient/family to participate in care and decision-making at the level they choose  - Honor patient and family perspectives and choices  Outcome: Progressing   Received pt from ED a/o X 4, VSS, no c/o pain. No seizure activity noted. Patient calm and cooperative. New admission orders noted. IV lacosamide infused as ordered. Seizure precautions maintained. Call light within reach.

## 2023-03-12 NOTE — ED INITIAL ASSESSMENT (HPI)
Pt brought in by EMS for witnessed seizure at home. Family reports she fell, generalized shaking last about 2 minutes. Pt appeared post ictyl when EMS arrived. No history of seizures. Pt on blood thinner for A. Fib. C- collar applied by EMS. RR even and nonlabored, speaking in short sentences.

## 2023-03-12 NOTE — ED QUICK NOTES
Orders for admission, patient is aware of plan and ready to go upstairs. Any questions, please call ED RN Ata Anderson  at extension [phone]. Type of COVID test sent:Rapid  COVID Suspicion level: Low    Titratable drug(s) infusing:none      LOC at time of transport:A&Ox 2 of 3 now. Other pertinent information: Chronic SI/HI,low risk. Has psych consult. Seizure precautions.

## 2023-03-13 LAB
ANION GAP SERPL CALC-SCNC: 5 MMOL/L (ref 0–18)
BASOPHILS # BLD AUTO: 0.02 X10(3) UL (ref 0–0.2)
BASOPHILS NFR BLD AUTO: 0.3 %
BUN BLD-MCNC: 17 MG/DL (ref 7–18)
BUN/CREAT SERPL: 16 (ref 10–20)
CALCIUM BLD-MCNC: 9.4 MG/DL (ref 8.5–10.1)
CHLORIDE SERPL-SCNC: 109 MMOL/L (ref 98–112)
CO2 SERPL-SCNC: 27 MMOL/L (ref 21–32)
CREAT BLD-MCNC: 1.06 MG/DL
DEPRECATED RDW RBC AUTO: 47 FL (ref 35.1–46.3)
EOSINOPHIL # BLD AUTO: 0.02 X10(3) UL (ref 0–0.7)
EOSINOPHIL NFR BLD AUTO: 0.3 %
ERYTHROCYTE [DISTWIDTH] IN BLOOD BY AUTOMATED COUNT: 14 % (ref 11–15)
GFR SERPLBLD BASED ON 1.73 SQ M-ARVRAT: 57 ML/MIN/1.73M2 (ref 60–?)
GLUCOSE BLD-MCNC: 93 MG/DL (ref 70–99)
HCT VFR BLD AUTO: 42.7 %
HGB BLD-MCNC: 14 G/DL
IMM GRANULOCYTES # BLD AUTO: 0.03 X10(3) UL (ref 0–1)
IMM GRANULOCYTES NFR BLD: 0.4 %
LYMPHOCYTES # BLD AUTO: 1.13 X10(3) UL (ref 1–4)
LYMPHOCYTES NFR BLD AUTO: 15.2 %
MAGNESIUM SERPL-MCNC: 2.2 MG/DL (ref 1.6–2.6)
MCH RBC QN AUTO: 29.8 PG (ref 26–34)
MCHC RBC AUTO-ENTMCNC: 32.8 G/DL (ref 31–37)
MCV RBC AUTO: 90.9 FL
MONOCYTES # BLD AUTO: 0.7 X10(3) UL (ref 0.1–1)
MONOCYTES NFR BLD AUTO: 9.4 %
NEUTROPHILS # BLD AUTO: 5.52 X10 (3) UL (ref 1.5–7.7)
NEUTROPHILS # BLD AUTO: 5.52 X10(3) UL (ref 1.5–7.7)
NEUTROPHILS NFR BLD AUTO: 74.4 %
OSMOLALITY SERPL CALC.SUM OF ELEC: 293 MOSM/KG (ref 275–295)
PLATELET # BLD AUTO: 254 10(3)UL (ref 150–450)
POTASSIUM SERPL-SCNC: 4.3 MMOL/L (ref 3.5–5.1)
RBC # BLD AUTO: 4.7 X10(6)UL
SODIUM SERPL-SCNC: 141 MMOL/L (ref 136–145)
WBC # BLD AUTO: 7.4 X10(3) UL (ref 4–11)

## 2023-03-13 PROCEDURE — 95816 EEG AWAKE AND DROWSY: CPT | Performed by: OTHER

## 2023-03-13 PROCEDURE — 90792 PSYCH DIAG EVAL W/MED SRVCS: CPT | Performed by: OTHER

## 2023-03-13 PROCEDURE — 99223 1ST HOSP IP/OBS HIGH 75: CPT | Performed by: OTHER

## 2023-03-13 RX ORDER — OLANZAPINE 2.5 MG/1
5 TABLET ORAL NIGHTLY
Status: DISCONTINUED | OUTPATIENT
Start: 2023-03-13 | End: 2023-03-14

## 2023-03-13 NOTE — PROCEDURES
EEG report    REFERRING PHYSICIAN: Candance Martyr, MD    PCP and phone number:  Janel Chandler  [phone]    TECHNIQUE: 21 channels of EEG, 2 channels of EOG, and 1 channel of EKG were recorded utilizing the International 10/20 System. The recording was performed in a digitized monopolar referential format and playback was reformatted into various referential and bipolar montages utilizing appropriate filter settings. Automatic seizure and spike detection programs were utilized throughout the recording. Video was not recorded during the study    CLINICAL DATA:  Patient is sent for the evaluation of possible seizures. MEDICATION:  Continuous Medications:      Scheduled Medications:  No current outpatient medications on file. PRN Medications:  acetaminophen, polyethylene glycol (PEG 3350), sennosides, bisacodyl, ondansetron, albuterol    ACTIVATION:  Hyperventilation: Not done  Photic stimulation: Not done  Sleep: Normal sleep architecture was seen. BACKGROUND  While the patient was awake, the posterior dominant rhythm consisted of well-regulated 9-10 Hz rhythmic waveforms, symmetrically distributed over both posterior quadrants and was reactive to eye opening. EEG ABNORMALITY  None    IMPRESSION:  This is a normal EEG. No focal, lateralized, or epileptiform features are noted. Clinical correlation required.

## 2023-03-13 NOTE — PLAN OF CARE
Patient ambulated in the halls twice today with a rolling walker and a stand by assist. Patient resting in bed, vital signs stable, bed in lowest position, call light within reach, all safety precautions in place. Continued intentional nursing rounds. Problem: Patient Centered Care  Goal: Patient preferences are identified and integrated in the patient's plan of care  Description: Interventions:  - What would you like us to know as we care for you?  From home with   - Provide timely, complete, and accurate information to patient/family  - Incorporate patient and family knowledge, values, beliefs, and cultural backgrounds into the planning and delivery of care  - Encourage patient/family to participate in care and decision-making at the level they choose  - Honor patient and family perspectives and choices  3/13/2023 1628 by Mihaela Dash RN  Outcome: Progressing     Problem: Patient/Family Goals  Goal: Patient/Family Long Term Goal  Description: Patient's Long Term Goal: Absence of seizures    Interventions:  - Neurology consulted  - Psychiatry consulted  - Monitor vital signs  - See additional Care Plan goals for specific interventions  3/13/2023 1628 by Mihaela Dash, RN  Outcome: Progressing     Problem: Patient/Family Goals  Goal: Patient/Family Short Term Goal  Description: Patient's Short Term Goal: Discharge home and be free of injuries    Interventions:   - Seizure precautions in place  - Deliberate discharge planning  - Multidisciplinary team engaged  - See additional Care Plan goals for specific interventions  3/13/2023 1628 by Mihaela Dash, RN  Outcome: Progressing     Problem: MUSCULOSKELETAL - ADULT  Goal: Return mobility to safest level of function  Description: INTERVENTIONS:  - Assess patient stability and activity tolerance for standing, transferring and ambulating w/ or w/o assistive devices  - Assist with transfers and ambulation using safe patient handling equipment as needed  - Ensure adequate protection for wounds/incisions during mobilization  - Obtain PT/OT consults as needed  - Advance activity as appropriate  - Communicate ordered activity level and limitations with patient/family  3/13/2023 1628 by Cristina White RN  Outcome: Progressing     Problem: NEUROLOGICAL - ADULT  Goal: Achieves stable or improved neurological status  Description: INTERVENTIONS  - Assess for and report changes in neurological status  - Initiate measures to prevent increased intracranial pressure  - Maintain blood pressure and fluid volume within ordered parameters to optimize cerebral perfusion and minimize risk of hemorrhage  - Monitor temperature, glucose, and sodium.  Initiate appropriate interventions as ordered  3/13/2023 1628 by Cristina White RN  Outcome: Progressing     Problem: NEUROLOGICAL - ADULT  Goal: Absence of seizures  Description: INTERVENTIONS  - Monitor for seizure activity  - Administer anti-seizure medications as ordered  - Monitor neurological status  3/13/2023 1628 by Cristina White RN  Outcome: Progressing     Problem: NEUROLOGICAL - ADULT  Goal: Remains free of injury related to seizure activity  Description: INTERVENTIONS:  - Maintain airway, patient safety  and administer oxygen as ordered  - Monitor patient for seizure activity, document and report duration and description of seizure to MD/LIP  - If seizure occurs, turn patient to side and suction secretions as needed  - Reorient patient post seizure  - Seizure pads on all 4 side rails  - Instruct patient/family to notify RN of any seizure activity  - Instruct patient/family to call for assistance with activity based on assessment  3/13/2023 1628 by Cristina White RN  Outcome: Progressing     Problem: NEUROLOGICAL - ADULT  Goal: Achieves maximal functionality and self care  Description: INTERVENTIONS  - Monitor swallowing and airway patency with patient fatigue and changes in neurological status  - Encourage and assist patient to increase activity and self care with guidance from PT/OT  - Encourage visually impaired, hearing impaired and aphasic patients to use assistive/communication devices  3/13/2023 1628 by April Paredes RN  Outcome: Progressing     Problem: Impaired Functional Mobility  Goal: Achieve highest/safest level of mobility/gait  Description: Interventions:  - Assess patient's functional ability and stability  - Promote increasing activity/tolerance for mobility and gait  - Educate and engage patient/family in tolerated activity level and precautions  - Recommend use of  RW for transfers and ambulation  3/13/2023 1628 by April Paredes RN  Outcome: Progressing     Problem: Impaired Activities of Daily Living  Goal: Achieve highest/safest level of independence in self care  Description: Interventions:  - Assess ability and encourage patient to participate in ADLs to maximize function  - Promote sitting position while performing ADLs such as feeding, grooming, and bathing  - Educate and encourage patient/family in tolerated functional activity level and precautions during self-care3/13/2023 1628 by April Paredes RN  Outcome: Progressing

## 2023-03-14 ENCOUNTER — APPOINTMENT (OUTPATIENT)
Dept: MRI IMAGING | Facility: HOSPITAL | Age: 68
End: 2023-03-14
Attending: Other
Payer: MEDICARE

## 2023-03-14 VITALS
WEIGHT: 157 LBS | TEMPERATURE: 98 F | OXYGEN SATURATION: 98 % | HEART RATE: 91 BPM | SYSTOLIC BLOOD PRESSURE: 130 MMHG | BODY MASS INDEX: 27.82 KG/M2 | HEIGHT: 63 IN | RESPIRATION RATE: 20 BRPM | DIASTOLIC BLOOD PRESSURE: 69 MMHG

## 2023-03-14 DIAGNOSIS — G40.909 SEIZURE DISORDER (HCC): Primary | ICD-10-CM

## 2023-03-14 PROCEDURE — 99232 SBSQ HOSP IP/OBS MODERATE 35: CPT | Performed by: OTHER

## 2023-03-14 PROCEDURE — 70553 MRI BRAIN STEM W/O & W/DYE: CPT | Performed by: OTHER

## 2023-03-14 PROCEDURE — 99233 SBSQ HOSP IP/OBS HIGH 50: CPT | Performed by: NURSE PRACTITIONER

## 2023-03-14 RX ORDER — GADOTERATE MEGLUMINE 376.9 MG/ML
15 INJECTION INTRAVENOUS
Status: COMPLETED | OUTPATIENT
Start: 2023-03-14 | End: 2023-03-14

## 2023-03-14 RX ORDER — OLANZAPINE 5 MG/1
5 TABLET ORAL NIGHTLY
Qty: 30 TABLET | Refills: 1 | Status: SHIPPED | OUTPATIENT
Start: 2023-03-14

## 2023-03-14 RX ORDER — LACOSAMIDE 100 MG/1
100 TABLET ORAL 2 TIMES DAILY
Qty: 180 TABLET | Refills: 0 | Status: SHIPPED | OUTPATIENT
Start: 2023-03-14 | End: 2023-06-12

## 2023-03-14 RX ORDER — OLANZAPINE 5 MG/1
5 TABLET ORAL NIGHTLY
Refills: 0 | Status: SHIPPED | COMMUNITY
Start: 2023-03-14 | End: 2023-03-14

## 2023-03-14 NOTE — DISCHARGE INSTRUCTIONS
Please follow-up with neurology, psychiatry, PCP in 1 to 2 weeks. No driving until cleared by neurology    Increase activity as tolerated    SEIZURES   Please do NOT drive until you are cleared by neurology    GENERAL INFORMATION:   A seizure, or convulsion, is uncontrolled jerking of the arms, legs, or face that lasts anywhere from a few seconds to minutes. Seizures can occur after a head injury, stroke, or brain tissue infection. In more than half of patients, the cause is not known. This is called epilepsy. INSTRUCTIONS:   1. Please continue to take Lacosamide/Vimpat to prevent seizures, take it exactly as directed. Do not stop taking the medicine without talking to your doctor first, even if you have not had a seizure in a long time. 2. Avoid activities in which a seizure would cause danger to yourself or to others. Don't operate dangerous machinery, swim alone, or climb in high or dangerous places, such as on ladders, roofs, or girders. Do not drive unless your doctor says you may. 3. Wear an emergency medical identification bracelet with information about your seizure. If you have a seizure, people around you will know what is wrong and get appropriate help. 4. If you have any warning that you may have a seizure, lay down in a safe place where you can't hurt yourself. 5. Do not drink alcohol or take illegal drugs. These can make you more susceptible to having seizures. 6. Please avoid potential seizure triggers as much as possible: stress, sleep deprivation, alcohol, successive stimulant use, illegal drugs, illness/infections     CONTACT YOUR DOCTOR IF:   You have any problems that may be related to the medicine you are taking. Teach your family, close friends, or co-workers what to do if you have a seizure:   1. Stay calm. Keep the person from falling onto harmful objects. Move hard or sharp objects out of the way.    2. Don't force anything into the person's mouth or try to open clenched jaws. Turn the person on his or her side when the violent movement stops or if he or she is vomiting. 3. When the seizure is over, the person may be confused or drowsy. Reassure the person that he or she is all right. Help him or her to relax. 4. Call 911 and bring the patient back to the ED if:   a. The patient doesn't awaken shortly after the seizure   b. The patient has new problems such as difficulty seeing, speaking or moving   c. The patient was injured during the seizure   d. The patient has a temperature over 102 F (39C)   e. The patient vomited and now is having trouble breathing    Please continue to practice seizure precautions and first aid. Please do not climb to high places, such as rooftops, up trees or mountain climbing. When near water, you should be supervised by an adult or person who is aware of risk of seizures, for example during tub baths, swimming, boating or fishing. A helmet should be worn when riding a bike. First Aid for a generalized seizure:   -Remain calm and do not panic, call for assistance if needed.   -Lower the person safely to the ground and loosen any tight clothing.   -Place the person in a side-lying position so any saliva or vomit will easily drain out of the mouth. Actively seizing people are at a increased risk of choking on their saliva or vomit. Do not put any objects such as a tongue depressor or fingers into the mouth. Protect the persons head from injury while they are on their side.   -Time the seizure from start to finish so you know how long it lasted (most grand mal seizures are no more than 1 or 2 minutes long). If the seizure is continuing longer than 5 minutes, call the ambulance at 911 for transportation to the nearest Emergency Room. -After a grand mal seizure, people are very sleepy and tired for several minutes or even a couple of hours. They may also complain of headache, nausea and may vomit.    Please remember:   Call your doctor if you feel that the severity or number of seizures has changed from baseline. If you have several grand mal seizures without waking up in-between, please notify your doctor. ----------------------------------------------   Seizure safety during sleep:   Always take anti-epileptic medications as prescribed by your doctor. Objects near the bed may cause injury if someone has a seizure is prone to falling out of bed. Move heavy furniture, floor lamps, night stands and other dangerous objects away from the bed. Mattresses and pillows should be firm and not soft. All stuffed animals, toys and other objects should be removed from the bed. Blankets can be layered but should be thin, down comforters may be too soft. Keep the bed as low to the ground as possible. Some patients with night time seizures may sleep with their mattress on the floor. Others may pad the floor with mats (such as exercise mats used in workout facilities) to pad the floor. During sleep, keep the bedroom door cracked open so someone can hear if you are having a seizure. Never lock the bedroom door. Some people choose to use baby monitors to observe for seizures at night.

## 2023-03-14 NOTE — PLAN OF CARE
Pt A&Ox4, on RA, continent x2, standby assist w/ walker to bathroom. No witnessed seizure activity overnight. Plan: Brain MRI    Call light within reach, fall and seizure precautions in place. Problem: Patient Centered Care  Goal: Patient preferences are identified and integrated in the patient's plan of care  Description: Interventions:  - What would you like us to know as we care for you? From home with   - Provide timely, complete, and accurate information to patient/family  - Incorporate patient and family knowledge, values, beliefs, and cultural backgrounds into the planning and delivery of care  - Encourage patient/family to participate in care and decision-making at the level they choose  - Honor patient and family perspectives and choices  Outcome: Progressing     Problem: MUSCULOSKELETAL - ADULT  Goal: Return mobility to safest level of function  Description: INTERVENTIONS:  - Assess patient stability and activity tolerance for standing, transferring and ambulating w/ or w/o assistive devices  - Assist with transfers and ambulation using safe patient handling equipment as needed  - Ensure adequate protection for wounds/incisions during mobilization  - Obtain PT/OT consults as needed  - Advance activity as appropriate  - Communicate ordered activity level and limitations with patient/family  Outcome: Progressing     Problem: NEUROLOGICAL - ADULT  Goal: Achieves stable or improved neurological status  Description: INTERVENTIONS  - Assess for and report changes in neurological status  - Initiate measures to prevent increased intracranial pressure  - Maintain blood pressure and fluid volume within ordered parameters to optimize cerebral perfusion and minimize risk of hemorrhage  - Monitor temperature, glucose, and sodium.  Initiate appropriate interventions as ordered  Outcome: Progressing  Goal: Absence of seizures  Description: INTERVENTIONS  - Monitor for seizure activity  - Administer anti-seizure medications as ordered  - Monitor neurological status  Outcome: Progressing  Goal: Remains free of injury related to seizure activity  Description: INTERVENTIONS:  - Maintain airway, patient safety  and administer oxygen as ordered  - Monitor patient for seizure activity, document and report duration and description of seizure to MD/LIP  - If seizure occurs, turn patient to side and suction secretions as needed  - Reorient patient post seizure  - Seizure pads on all 4 side rails  - Instruct patient/family to notify RN of any seizure activity  - Instruct patient/family to call for assistance with activity based on assessment  Outcome: Progressing  Goal: Achieves maximal functionality and self care  Description: INTERVENTIONS  - Monitor swallowing and airway patency with patient fatigue and changes in neurological status  - Encourage and assist patient to increase activity and self care with guidance from PT/OT  - Encourage visually impaired, hearing impaired and aphasic patients to use assistive/communication devices  Outcome: Progressing     Problem: Impaired Functional Mobility  Goal: Achieve highest/safest level of mobility/gait  Description: Interventions:  - Assess patient's functional ability and stability  - Promote increasing activity/tolerance for mobility and gait  - Educate and engage patient/family in tolerated activity level and precautions  - Recommend use of  RW for transfers and ambulation  Outcome: Progressing     Problem: Impaired Activities of Daily Living  Goal: Achieve highest/safest level of independence in self care  Description: Interventions:  - Assess ability and encourage patient to participate in ADLs to maximize function  - Promote sitting position while performing ADLs such as feeding, grooming, and bathing  - Educate and encourage patient/family in tolerated functional activity level and precautions during self-care    Outcome: Progressing     Problem: Impaired Cognition  Goal: Patient will exhibit improved attention, thought processing and/or memory  Description: Interventions:  - Encourage use of eye glasses  Outcome: Progressing

## 2023-03-14 NOTE — PLAN OF CARE
Pt is aox3, resting in chair, up with walker. Awaiting MRI, possible dc today. No seizure like activity. Pt clear by neuro and psych for dc. Problem: Patient Centered Care  Goal: Patient preferences are identified and integrated in the patient's plan of care  Description: Interventions:  - What would you like us to know as we care for you?  From home with   - Provide timely, complete, and accurate information to patient/family  - Incorporate patient and family knowledge, values, beliefs, and cultural backgrounds into the planning and delivery of care  - Encourage patient/family to participate in care and decision-making at the level they choose  - Honor patient and family perspectives and choices  Outcome: Progressing     Problem: Patient/Family Goals  Goal: Patient/Family Long Term Goal  Description: Patient's Long Term Goal: Absence of seizures    Interventions:  - Neurology consulted  - Psychiatry consulted  - Monitor vital signs  - See additional Care Plan goals for specific interventions  Outcome: Progressing  Goal: Patient/Family Short Term Goal  Description: Patient's Short Term Goal: Discharge home and be free of injuries    Interventions:   - Seizure precautions in place  - Deliberate discharge planning  - Multidisciplinary team engaged  - See additional Care Plan goals for specific interventions  Outcome: Progressing     Problem: MUSCULOSKELETAL - ADULT  Goal: Return mobility to safest level of function  Description: INTERVENTIONS:  - Assess patient stability and activity tolerance for standing, transferring and ambulating w/ or w/o assistive devices  - Assist with transfers and ambulation using safe patient handling equipment as needed  - Ensure adequate protection for wounds/incisions during mobilization  - Obtain PT/OT consults as needed  - Advance activity as appropriate  - Communicate ordered activity level and limitations with patient/family  Outcome: Progressing     Problem: NEUROLOGICAL - ADULT  Goal: Achieves stable or improved neurological status  Description: INTERVENTIONS  - Assess for and report changes in neurological status  - Initiate measures to prevent increased intracranial pressure  - Maintain blood pressure and fluid volume within ordered parameters to optimize cerebral perfusion and minimize risk of hemorrhage  - Monitor temperature, glucose, and sodium.  Initiate appropriate interventions as ordered  Outcome: Progressing  Goal: Absence of seizures  Description: INTERVENTIONS  - Monitor for seizure activity  - Administer anti-seizure medications as ordered  - Monitor neurological status  Outcome: Progressing  Goal: Remains free of injury related to seizure activity  Description: INTERVENTIONS:  - Maintain airway, patient safety  and administer oxygen as ordered  - Monitor patient for seizure activity, document and report duration and description of seizure to MD/LIP  - If seizure occurs, turn patient to side and suction secretions as needed  - Reorient patient post seizure  - Seizure pads on all 4 side rails  - Instruct patient/family to notify RN of any seizure activity  - Instruct patient/family to call for assistance with activity based on assessment  Outcome: Progressing  Goal: Achieves maximal functionality and self care  Description: INTERVENTIONS  - Monitor swallowing and airway patency with patient fatigue and changes in neurological status  - Encourage and assist patient to increase activity and self care with guidance from PT/OT  - Encourage visually impaired, hearing impaired and aphasic patients to use assistive/communication devices  Outcome: Progressing     Problem: Impaired Functional Mobility  Goal: Achieve highest/safest level of mobility/gait  Description: Interventions:  - Assess patient's functional ability and stability  - Promote increasing activity/tolerance for mobility and gait  - Educate and engage patient/family in tolerated activity level and precautions    Outcome: Progressing     Problem: Impaired Activities of Daily Living  Goal: Achieve highest/safest level of independence in self care  Description: Interventions:  - Assess ability and encourage patient to participate in ADLs to maximize function  - Promote sitting position while performing ADLs such as feeding, grooming, and bathing  - Educate and encourage patient/family in tolerated functional activity level and precautions during self-care    Outcome: Progressing     Problem: Impaired Cognition  Goal: Patient will exhibit improved attention, thought processing and/or memory  Description: Interventions:  - Minimize distractions in the room when full attention is required  Outcome: Progressing

## 2023-03-14 NOTE — CM/SW NOTE
03/14/23 1002   Discharge disposition   Expected discharge disposition Home or Self   Discharge transportation Private car     Pt received MDO for Discharge, pt  is self, nn at this time     SW/CM to remain available for support and/or discharge planning.      Richard Voss, MSW, LSW   x [phone]

## 2023-04-03 ENCOUNTER — TELEPHONE (OUTPATIENT)
Dept: NEUROLOGY | Facility: CLINIC | Age: 68
End: 2023-04-03

## 2023-04-03 NOTE — TELEPHONE ENCOUNTER
Received some paperwork from a [facility] regarding an EMG but wasn't an actual Order. I left a message for the Patient telling her to have the Doctor Office send us an actual Order and state on there what the EMG is of.

## 2023-04-12 ENCOUNTER — OFFICE VISIT (OUTPATIENT)
Dept: NEUROLOGY | Facility: CLINIC | Age: 68
End: 2023-04-12
Payer: MEDICARE

## 2023-04-12 VITALS — HEIGHT: 63 IN | BODY MASS INDEX: 27.82 KG/M2 | WEIGHT: 157 LBS

## 2023-04-12 DIAGNOSIS — G40.909 SEIZURE DISORDER (HCC): Primary | ICD-10-CM

## 2023-04-12 PROCEDURE — 99213 OFFICE O/P EST LOW 20 MIN: CPT | Performed by: OTHER

## 2023-04-12 PROCEDURE — 1111F DSCHRG MED/CURRENT MED MERGE: CPT | Performed by: OTHER

## 2023-04-12 RX ORDER — LACOSAMIDE 100 MG/1
100 TABLET ORAL 2 TIMES DAILY
Qty: 180 TABLET | Refills: 11 | Status: SHIPPED | OUTPATIENT
Start: 2023-04-12 | End: 2023-07-11

## 2023-04-12 NOTE — PATIENT INSTRUCTIONS
-Follow up in 6 months or sooner if needed. -If you develop sudden onset loss of vision, double vision, room spinning/world spinning sensation, inability to speak or understand others who are speaking to you, slurred speech, balance problems, weakness on one side of your body, numbness on one side of your body or worst headache you ever had, please seek out emergency medical attention via 911 for the nearest emergency room to be evaluated for possible stroke. -MyChart or call office with any questions or concerns. Thank you for coming to see me today. The easiest way to communicate with me is via Fluklehart. Telanetixt is meant for simple questions regarding medications, possible side effects, or other simple straight forward questions in limited sentences, rather than multiple paragraphs of discussion. Telanetixt is not meant for, or efficient for these complex questions, extensive questions, extensive medication adjustments, complex new symptoms or concerns. These issues beyond simple questions require a follow up visit with myself. Refills:  Please pay attention to when your refills will need to be renewed. Due to the volume of phone calls daily, this could potentially take a few days, although we certainly try to honor your refill requests as soon as we can. You should call at least 1 week in advance of needing a refill to ensure you do not run out of medication. Keep in mind that refill requests on Fridays may not be filled until the following week.

## 2023-04-17 ENCOUNTER — TELEPHONE (OUTPATIENT)
Dept: NEUROLOGY | Facility: CLINIC | Age: 68
End: 2023-04-17

## 2023-04-17 NOTE — TELEPHONE ENCOUNTER
Patient called to say that her Psychiatrist would like to speak with Dr. Bhanu Zamora regarding her medications. His information is:  Dr. Suyapa Carrington  [phone] ext. [phone]  Hours are: Monday, Tuesday, Thursday  9 am-4:30pm

## 2023-06-13 ENCOUNTER — TELEPHONE (OUTPATIENT)
Dept: NEUROLOGY | Facility: CLINIC | Age: 68
End: 2023-06-13

## 2023-10-17 ENCOUNTER — OFFICE VISIT (OUTPATIENT)
Dept: NEUROLOGY | Facility: CLINIC | Age: 68
End: 2023-10-17
Payer: MEDICARE

## 2023-10-17 VITALS — WEIGHT: 157 LBS | BODY MASS INDEX: 27.82 KG/M2 | HEIGHT: 63 IN

## 2023-10-17 DIAGNOSIS — G40.909 SEIZURE DISORDER (HCC): ICD-10-CM

## 2023-10-17 PROCEDURE — 99213 OFFICE O/P EST LOW 20 MIN: CPT | Performed by: OTHER

## 2023-10-17 RX ORDER — APIXABAN 5 MG/1
5 TABLET, FILM COATED ORAL 2 TIMES DAILY
COMMUNITY
Start: 2023-10-12

## 2023-10-17 RX ORDER — LACOSAMIDE 100 MG/1
100 TABLET ORAL 2 TIMES DAILY
Qty: 180 TABLET | Refills: 3 | Status: SHIPPED | OUTPATIENT
Start: 2023-10-17 | End: 2024-07-13

## 2024-01-31 NOTE — PLAN OF CARE
Frequent rounding maintained. Patient educated on all medications administered. Bed in lowest position, bed alarm and i bed awareness activated, fall precautions in place and call light within reach at all times. All patients questions answered and needs addressed promptly. Problem: Patient Centered Care  Goal: Patient preferences are identified and integrated in the patient's plan of care  Description: Interventions:  - What would you like us to know as we care for you? \"I am on seizure precautions. \"  - Provide timely, complete, and accurate information to patient/family  - Incorporate patient and family knowledge, values, beliefs, and cultural backgrounds into the planning and delivery of care  - Encourage patient/family to participate in care and decision-making at the level they choose  - Honor patient and family perspectives and choices  Outcome: Progressing     Problem: MUSCULOSKELETAL - ADULT  Goal: Return mobility to safest level of function  Description: INTERVENTIONS:  - Assess patient stability and activity tolerance for standing, transferring and ambulating w/ or w/o assistive devices  - Assist with transfers and ambulation using safe patient handling equipment as needed  - Ensure adequate protection for wounds/incisions during mobilization  - Obtain PT/OT consults as needed  - Advance activity as appropriate  - Communicate ordered activity level and limitations with patient/family  Outcome: Progressing     Problem: NEUROLOGICAL - ADULT  Goal: Achieves stable or improved neurological status  Description: INTERVENTIONS  - Assess for and report changes in neurological status  - Initiate measures to prevent increased intracranial pressure  - Maintain blood pressure and fluid volume within ordered parameters to optimize cerebral perfusion and minimize risk of hemorrhage  - Monitor temperature, glucose, and sodium.  Initiate appropriate interventions as ordered  Outcome: Progressing  Goal: Absence of Hemodialysis done today, started @ 1312 and ended @ 1613 with net UF= 1000 ml. Report given to JASON Kumari.See PATH flow sheet for details   seizures  Description: INTERVENTIONS  - Monitor for seizure activity  - Administer anti-seizure medications as ordered  - Monitor neurological status  Outcome: Progressing  Goal: Remains free of injury related to seizure activity  Description: INTERVENTIONS:  - Maintain airway, patient safety  and administer oxygen as ordered  - Monitor patient for seizure activity, document and report duration and description of seizure to MD/LIP  - If seizure occurs, turn patient to side and suction secretions as needed  - Reorient patient post seizure  - Seizure pads on all 4 side rails  - Instruct patient/family to notify RN of any seizure activity  - Instruct patient/family to call for assistance with activity based on assessment  Outcome: Progressing  Goal: Achieves maximal functionality and self care  Description: INTERVENTIONS  - Monitor swallowing and airway patency with patient fatigue and changes in neurological status  - Encourage and assist patient to increase activity and self care with guidance from PT/OT  - Encourage visually impaired, hearing impaired and aphasic patients to use assistive/communication devices  Outcome: Progressing     Problem: Impaired Functional Mobility  Goal: Achieve highest/safest level of mobility/gait  Description: Interventions:  - Assess patient's functional ability and stability  - Promote increasing activity/tolerance for mobility and gait  - Educate and engage patient/family in tolerated activity level and precautions  - Recommend use of  walker for transfers and ambulation  - Recommend patient transfer to bedside chair toward strongest side  - Recommend use of chair position in bed 3 times per day  Outcome: Progressing     Problem: Impaired Activities of Daily Living  Goal: Achieve highest/safest level of independence in self care  Description: Interventions:  - Assess ability and encourage patient to participate in ADLs to maximize function  - Promote sitting position while performing ADLs such as feeding, grooming, and bathing  - Educate and encourage patient/family in tolerated functional activity level and precautions during self-care  - Encourage patient to incorporate impaired side during daily activities to promote function  Outcome: Progressing     Problem: Impaired Cognition  Goal: Patient will exhibit improved attention, thought processing and/or memory  Description: Interventions:  - Minimize distractions in the room when full attention is required  - Consider use of a daily journal to improve memory and reduce confusion related to daily events and orientation  - Allow additional time for processing after asking questions or providing instructions  - Provide stimulation to the neglected side by encouraging family to sit/stand on the inattentive side during conversation  - Encourage use of eye glasses  Outcome: Progressing

## 2024-03-11 DIAGNOSIS — G40.909 SEIZURE DISORDER (HCC): ICD-10-CM

## 2024-03-13 RX ORDER — LACOSAMIDE 100 MG/1
100 TABLET ORAL 2 TIMES DAILY
Qty: 180 TABLET | Refills: 0 | OUTPATIENT
Start: 2024-03-13

## 2024-03-13 NOTE — TELEPHONE ENCOUNTER
Medication Quantity Refills Start End   lacosamide 100 MG Oral Tab 180 tablet 3 10/17/2023 7/13/2024   Sig:   Take 1 tablet (100 mg total) by mouth 2 (two) times daily.     Route:   Oral     Order #:   [id number]       Patient has refills on file.      [id number] - [place], [place] - [address] AT [facility], [phone], [phone]

## 2024-05-15 ENCOUNTER — TELEPHONE (OUTPATIENT)
Dept: NEUROLOGY | Facility: CLINIC | Age: 69
End: 2024-05-15

## 2024-05-15 NOTE — TELEPHONE ENCOUNTER
Phone call returned to pt. Advised pt that she should remain on her Vimpat for her colonoscopy. Pt verbalized understanding.

## 2024-06-03 DIAGNOSIS — G40.909 SEIZURE DISORDER (HCC): ICD-10-CM

## 2024-06-03 NOTE — TELEPHONE ENCOUNTER
Medication Quantity Refills Start End   lacosamide 100 MG Oral Tab 180 tablet 3 10/17/2023 7/13/2024   Sig:   Take 1 tablet (100 mg total) by mouth 2 (two) times daily.     Route:   Oral     Order #:   [id number]         Please review and sign if appropriate      LOV:10/17/2023  NOV: none    [place]: 10/17/2023 (#180 /3 refills)

## 2024-06-05 RX ORDER — LACOSAMIDE 100 MG/1
100 TABLET ORAL 2 TIMES DAILY
Qty: 180 TABLET | Refills: 0 | Status: SHIPPED | OUTPATIENT
Start: 2024-06-05

## 2024-07-18 ENCOUNTER — ANESTHESIA EVENT (OUTPATIENT)
Dept: ENDOSCOPY | Facility: HOSPITAL | Age: 69
End: 2024-07-18
Payer: MEDICARE

## 2024-07-18 ENCOUNTER — HOSPITAL ENCOUNTER (OUTPATIENT)
Facility: HOSPITAL | Age: 69
Setting detail: HOSPITAL OUTPATIENT SURGERY
Discharge: HOME | End: 2024-07-18
Attending: INTERNAL MEDICINE | Admitting: INTERNAL MEDICINE
Payer: MEDICARE

## 2024-07-18 ENCOUNTER — ANESTHESIA (OUTPATIENT)
Dept: ENDOSCOPY | Facility: HOSPITAL | Age: 69
End: 2024-07-18
Payer: MEDICARE

## 2024-07-18 VITALS
SYSTOLIC BLOOD PRESSURE: 106 MMHG | WEIGHT: 184 LBS | HEART RATE: 79 BPM | BODY MASS INDEX: 32.6 KG/M2 | TEMPERATURE: 97 F | OXYGEN SATURATION: 97 % | HEIGHT: 63 IN | DIASTOLIC BLOOD PRESSURE: 53 MMHG | RESPIRATION RATE: 18 BRPM

## 2024-07-18 DIAGNOSIS — Z86.010 HX OF COLONIC POLYP: ICD-10-CM

## 2024-07-18 PROCEDURE — 0DBH8ZX EXCISION OF CECUM, VIA NATURAL OR ARTIFICIAL OPENING ENDOSCOPIC, DIAGNOSTIC: ICD-10-PCS | Performed by: INTERNAL MEDICINE

## 2024-07-18 PROCEDURE — 88305 TISSUE EXAM BY PATHOLOGIST: CPT | Performed by: INTERNAL MEDICINE

## 2024-07-18 PROCEDURE — 0DBL8ZX EXCISION OF TRANSVERSE COLON, VIA NATURAL OR ARTIFICIAL OPENING ENDOSCOPIC, DIAGNOSTIC: ICD-10-PCS | Performed by: INTERNAL MEDICINE

## 2024-07-18 PROCEDURE — 3E0H8GC INTRODUCTION OF OTHER THERAPEUTIC SUBSTANCE INTO LOWER GI, VIA NATURAL OR ARTIFICIAL OPENING ENDOSCOPIC: ICD-10-PCS | Performed by: INTERNAL MEDICINE

## 2024-07-18 DEVICE — REPLAY HEMOSTASIS CLIP, 11MM SPAN
Type: IMPLANTABLE DEVICE | Site: COLON | Status: FUNCTIONAL
Brand: REPLAY

## 2024-07-18 RX ORDER — SODIUM CHLORIDE, SODIUM LACTATE, POTASSIUM CHLORIDE, CALCIUM CHLORIDE 600; 310; 30; 20 MG/100ML; MG/100ML; MG/100ML; MG/100ML
INJECTION, SOLUTION INTRAVENOUS CONTINUOUS
Status: DISCONTINUED | OUTPATIENT
Start: 2024-07-18 | End: 2024-07-18

## 2024-07-18 RX ORDER — NALOXONE HYDROCHLORIDE 0.4 MG/ML
0.08 INJECTION, SOLUTION INTRAMUSCULAR; INTRAVENOUS; SUBCUTANEOUS ONCE AS NEEDED
Status: DISCONTINUED | OUTPATIENT
Start: 2024-07-18 | End: 2024-07-18

## 2024-07-18 RX ORDER — MIDAZOLAM HYDROCHLORIDE 1 MG/ML
1 INJECTION INTRAMUSCULAR; INTRAVENOUS EVERY 5 MIN PRN
Status: DISCONTINUED | OUTPATIENT
Start: 2024-07-18 | End: 2024-07-18

## 2024-07-18 RX ORDER — SODIUM CHLORIDE 0.9 % (FLUSH) 0.9 %
10 SYRINGE (ML) INJECTION AS NEEDED
Status: DISCONTINUED | OUTPATIENT
Start: 2024-07-18 | End: 2024-07-18

## 2024-07-18 RX ORDER — LIDOCAINE HYDROCHLORIDE 10 MG/ML
INJECTION, SOLUTION EPIDURAL; INFILTRATION; INTRACAUDAL; PERINEURAL AS NEEDED
Status: DISCONTINUED | OUTPATIENT
Start: 2024-07-18 | End: 2024-07-18 | Stop reason: SURG

## 2024-07-18 RX ADMIN — SODIUM CHLORIDE, SODIUM LACTATE, POTASSIUM CHLORIDE, CALCIUM CHLORIDE: 600; 310; 30; 20 INJECTION, SOLUTION INTRAVENOUS at 09:29:00

## 2024-07-18 RX ADMIN — SODIUM CHLORIDE, SODIUM LACTATE, POTASSIUM CHLORIDE, CALCIUM CHLORIDE: 600; 310; 30; 20 INJECTION, SOLUTION INTRAVENOUS at 10:24:00

## 2024-07-18 RX ADMIN — LIDOCAINE HYDROCHLORIDE 40 MG: 10 INJECTION, SOLUTION EPIDURAL; INFILTRATION; INTRACAUDAL; PERINEURAL at 09:32:00

## 2024-07-18 NOTE — ANESTHESIA PREPROCEDURE EVALUATION
Anesthesia PreOp Note    HPI:     Maryellen Alexandre is a 69 year old female who presents for preoperative consultation requested by: Joe Burris MD    Date of Surgery: 7/18/2024    Procedure(s):  COLONOSCOPY  Indication: Hx of colonic polyp    Relevant Problems   No relevant active problems       NPO:  Last Liquid Consumption Date: 07/18/24  Last Liquid Consumption Time: 0530  Last Solid Consumption Date: 07/16/24  Last Solid Consumption Time: 1800  Last Liquid Consumption Date: 07/18/24          History Review:  Patient Active Problem List    Diagnosis Date Noted   • Seizure disorder (HCC) 03/12/2023   • Depression, unspecified depression type 03/12/2023   • Generalized anxiety disorder with panic attacks 08/07/2022   • Bipolar I disorder, most recent episode depressed, severe w psychosis (HCC) 06/27/2022   • Anxiety with somatization 06/27/2022   • Suicidal ideation 06/27/2022   • Hypokalemia 06/26/2022   • Positive colorectal cancer screening using Cologuard test 03/06/2019   • Hypothyroidism 02/14/2017   • Decreased GFR 02/14/2017   • Hyperlipidemia 02/14/2017   • Seasonal affective disorder (HCC) 01/03/2017   • S/P total abdominal hysterectomy 10/20/2016   • Osteopenia 10/20/2016   • Unspecified asthma(493.90) 04/13/2009   • Moderate depressed bipolar I disorder (HCC) 04/13/2009       Past Medical History:   • ALLERGIC RHINITIS   • Arrhythmia   • ASTHMA   • Asthma (HCC)   • Atrial fibrillation (HCC)   • BACK PAIN    chronic cervical sprain   • Bipolar I disorder (HCC)   • Disorder of thyroid   • Endometriosis   • High cholesterol   • IBS (irritable bowel syndrome)   • OTHER DISEASES    positive PPD-neg. CXR   • Seizure (HCC)    last seizure march 2023   • Seizure disorder (HCC)   • Thyroid disorder   • Visual floaters   • Vitamin D deficiency       Past Surgical History:   Procedure Laterality Date   • Colonoscopy  2005    diverticulosis, hemorrhoids, probable adhesions   • Colonoscopy  03/2019   •  Colonoscopy N/A 3/18/2019    Procedure: COLONOSCOPY, POSSIBLE BIOPSY, POSSIBLE POLYPECTOMY 01841;  Surgeon: Joe Burris MD;  Location: [facility]   • Hysterectomy  1995    HAN for endometriosis   • Knee arthroscopy Right     bone spur   • Other surgical history      nasal septum repair   • Other surgical history      injection tendon sheath, ligament   • Other surgical history Right     bunionectomy       Medications Prior to Admission   Medication Sig Dispense Refill Last Dose   • LACOSAMIDE 100 MG Oral Tab TAKE 1 TABLET(100 MG) BY MOUTH TWICE DAILY 180 tablet 0 7/17/2024 at 1800   • ELIQUIS 5 MG Oral Tab Take 1 tablet (5 mg total) by mouth 2 (two) times daily.   7/16/2024 at 1800   • dilTIAZem  MG Oral Capsule SR 24 Hr Take 1 capsule (240 mg total) by mouth daily.   7/17/2024 at 0900   • pyridoxine 100 MG Oral Tab Take 1 tablet (100 mg total) by mouth daily.   7/11/2024   • cholecalciferol 50 MCG (2000 UT) Oral Tab Take 1 tablet (2,000 Units total) by mouth daily.   7/11/2024   • Probiotic Product (FLORAJEN DIGESTION OR) Take 1 capsule by mouth daily.   7/11/2024   • OLANZapine 5 MG Oral Tab Take 1 tablet (5 mg total) by mouth nightly. 30 tablet 1 7/16/2024 at 2100   • acetaminophen 500 MG Oral Tab Take 1 tablet (500 mg total) by mouth as needed for Pain.      • Levothyroxine Sodium 50 MCG Oral Tab TAKE 1 TABLET BEFORE BREAKFAST 90 tablet 1 7/16/2024 at 0600   • QUEtiapine Fumarate (SEROQUEL) 200 MG Oral Tab Take 1.5 tablets (300 mg total) by mouth nightly.   7/16/2024 at 2100   • Spacer/Aero-Holding Chambers (VALVED HOLDING CHAMBER) Does not apply Device         Current Facility-Administered Medications Ordered in Epic   Medication Dose Route Frequency Provider Last Rate Last Admin   • lactated ringers infusion   Intravenous Continuous Joe Burris MD         No current [facility]-ordered outpatient medications on file.       Allergies   Allergen Reactions   • Demerol HALLUCINATION   •  Dust WHEEZING   • Mold Coughing   • Prochlorperazine Edisylate HALLUCINATION and NAUSEA AND VOMITING   • Estrogens OTHER (SEE COMMENTS)     Weak     • Other OTHER (SEE COMMENTS)     Saffris, \"pass out with turning her head\"   • Ezetimibe RASH   • Statins OTHER (SEE COMMENTS)     Weakness         Family History   Problem Relation Age of Onset   • Diabetes Father    • Stroke Father         CVA   • Other (Kidney Stone) Father    • Lung Disorder Mother         emphysema   • Heart Disease Mother         CAD   • Breast Cancer Mother         dx in 70's   • Other (COPD) Mother         copd-tob   • Hypertension Brother    • Bipolar Disorder Brother         bipolar-manic   • Thyroid Disorder Brother    • Schizophrenia Brother    • Arthritis Brother    • Other (Sleep Apnea) Brother      Social History     Socioeconomic History   • Marital status:    Occupational History   • Occupation: nurse-disability   Tobacco Use   • Smoking status: Never     Passive exposure: Never   • Smokeless tobacco: Never   Vaping Use   • Vaping status: Never Used   Substance and Sexual Activity   • Alcohol use: No     Alcohol/week: 0.0 standard drinks of alcohol   • Drug use: No   • Sexual activity: Yes     Partners: Male     Birth control/protection: Hysterectomy   Other Topics Concern   • Caffeine Concern No       Available pre-op labs reviewed.             Vital Signs:  Body mass index is 32.59 kg/m².   height is 1.6 m (5' 3\") and weight is 83.5 kg (184 lb). Her blood pressure is 122/90 and her pulse is 89. Her respiration is 24 and oxygen saturation is 98%.   Vitals:    07/11/24 0907 07/18/24 0844   BP:  122/90   Pulse:  89   Resp:  24   SpO2:  98%   Weight: 83.5 kg (184 lb)    Height: 1.6 m (5' 3\")         Anesthesia Evaluation     Patient summary reviewed and Nursing notes reviewed    Airway   Mallampati: I  TM distance: >3 FB  Neck ROM: full  Dental - Dentition appears grossly intact     Pulmonary - normal exam   (+) asthma    ROS  comment: Uses inhaler once every 2 years  Cardiovascular - normal exam  Exercise tolerance: poor  (+) dysrhythmias    ECG reviewed  ROS comment: EKG:  NSR    Neuro/Psych    (+)  seizures well controlled,  bipolar disorder, depression      Comments: 1 seizurea year ago, no residual effects    GI/Hepatic/Renal    (+) bowel prep    Endo/Other    (+) hypothyroidism  Abdominal   (+) obese               Anesthesia Plan:   ASA:  3  Plan:   General  Plan Comments: Pt held Eliquis last dose taken 7/16 at 6 PM... Discussed with Dr. Burris says he is comfortable to proceed  Informed Consent Plan and Risks Discussed With:  Patient  Discussed plan with:  Surgeon    I have informed Maryellen Alexandre and/or legal guardian or family member of the nature of the anesthetic plan, benefits, risks including possible dental damage if relevant, major complications, and any alternative forms of anesthetic management.   All of the patient's questions were answered to the best of my ability. The patient desires the anesthetic management as planned.  Brenda Morris CRNA  7/18/2024 9:02 AM  Present on Admission:  **None**

## 2024-07-18 NOTE — OPERATIVE REPORT
COLONOSCOPY REPORT    Patient Name:  Maryellen Alexandre  Medical Record #: [id number]  YOB: 1955  Date of Procedure: 7/18/2024    Referring physician: RUY RUBIN    Surgeon:  Joe Burris MD    Pre-op diagnosis: Colon cancer screening: History of polyps  (Last colonoscopy 5 years ago)  Post-op diagnosis:  3 polyps    Medications given:  MAC    Procedure:    After informed consent, discussion of risks and alternatives the patient was placed in the left lateral decubitus position and the high definition colonoscope was introduced into the rectum and advanced under direct visualization to the ileum.  Bowel preparation was excellent.  The mucosa was carefully inspected upon withdrawal of the scope.  Withdrawal time was 48 minutes.  ASA class was 3.    Findings:   The ileal mucosa was normal. .  There were 2 large sessile polyps in the cecum.  One was 2.5 cm in length and the other was 4 cm x 2.5 cm.  The smaller polyp was piecemeal snare excised. Endoscopic mucosal resection was carried out on the larger polyp as follows: Eleview was injected into the polyp to identify and delineate the borders of the polyp and to raise it from deeper tissues for safer excision.  A variety of snares were then used to piecemeal excise the polyp.  5 clips were used to close the 2 polypectomy sites.  A 12 mm proximal transverse colon polyp was hot snare excised.  There was no evidence of ulcerations, colitis, vascular anomalies, diverticulosis or mass lesions.  Retroflexed view of the anus revealed internal hemorrhoids. Digital rectal exam was normal.    Plan:  High fiber diet  Await biopsy results  Continue to hold Eliquis today  Repeat colonoscopy in 6 months pending pathologic review    Specimens: polyps  EBL: minimal    Aronchick bowel prep scale:  5 Inadequate (repeat preparation needed)  4 Poor (semisolid stool could not be suctioned and <90% of mucosa seen)  3 Fair (semisolid stool could not be suctioned, but >90%  of mucosa seen)  2 Good (clear liquid covering up to 25% of mucosa, but >90% of mucosa seen)  1 Excellent (>95% of mucosa seen)

## 2024-07-18 NOTE — H&P
RE-PROCEDURE UPDATE    HPI: Maryellen Alexandre is a 69 year old female. 5/2/1955.    Patient presents for a colonoscopy.    ALLERGIES:   Allergies   Allergen Reactions    Demerol HALLUCINATION    Dust WHEEZING    Mold Coughing    Prochlorperazine Edisylate HALLUCINATION and NAUSEA AND VOMITING    Estrogens OTHER (SEE COMMENTS)     Weak      Other OTHER (SEE COMMENTS)     Saffris, \"pass out with turning her head\"    Ezetimibe RASH    Statins OTHER (SEE COMMENTS)     Weakness         No current outpatient medications on file.     Past Medical History:    ALLERGIC RHINITIS    Arrhythmia    ASTHMA    Asthma (HCC)    Atrial fibrillation (HCC)    BACK PAIN    chronic cervical sprain    Bipolar I disorder (HCC)    Disorder of thyroid    Endometriosis    High cholesterol    IBS (irritable bowel syndrome)    OTHER DISEASES    positive PPD-neg. CXR    Seizure (HCC)    last seizure march 2023    Seizure disorder (HCC)    Thyroid disorder    Visual floaters    Vitamin D deficiency     Past Surgical History:   Procedure Laterality Date    Colonoscopy  2005    diverticulosis, hemorrhoids, probable adhesions    Colonoscopy  03/2019    Colonoscopy N/A 3/18/2019    Procedure: COLONOSCOPY, POSSIBLE BIOPSY, POSSIBLE POLYPECTOMY 17305;  Surgeon: Joe Burris MD;  [place] [facility] [place] [facility]    Hysterectomy  1995    HAN for endometriosis    Knee arthroscopy Right     bone spur    Other surgical history      nasal septum repair    Other surgical history      injection tendon sheath, ligament    Other surgical history Right     bunionectomy       PHYSICAL EXAM:  /90 (BP Location: Left arm)   Pulse 89   Resp 24   Ht 5' 3\" (1.6 m)   Wt 184 lb (83.5 kg)   SpO2 98%   BMI 32.59 kg/m²   LUNGS:   Clear to auscultation.  CARDIAC:   RRR, normal S1, S2; no S3 or murmur appreciated.  ABDOMEN:   Bowel sounds normoactive. Soft, no organomegaly or masses appreciated. Nontender.    IMPRESSION: Personal history of colon  polyps      PLAN:  No significant changes except as outlined above.     Colonoscopy with possible biopsy/polypectomy--the planned endoscopic procedure, indications, risks, benefits and post-op precautions were discussed and questions were answered in the pre-operative area.     Joe Burris MD

## 2024-07-18 NOTE — DISCHARGE INSTRUCTIONS
Home Care Instructions for Colonoscopy with Sedation    Diet:  - Resume your regular diet as tolerated unless otherwise instructed.  - Start with light meals to minimize bloating.  - Do not drink alcohol today.    Medication:  - If you have questions about resuming your normal medications, please contact your Primary Care Physician.    Activities:  - Take it easy today. Do not return to work today.  - Do not drive today.  - Do not operate any machinery today (including kitchen equipment).    Colonoscopy:  - You may notice some rectal \"spotting\" (a little blood on the toilet tissue) for a day or two after the exam. This is normal.  - If you experience any rectal bleeding (not spotting), persistent tenderness or sharp severe abdominal pains, oral temperature over 100 degrees Fahrenheit, light-headedness or dizziness, or any other problems, contact your doctor.    **If unable to reach your doctor, please go to the [facility] Emergency Room**    - Your referring physician will receive a full report of your examination.  - If you do not hear from your doctor's office within two weeks of your biopsy, please call them for your results.    You may be able to see your laboratory results in wripl between 4 and 7 business days.  In some cases, your physician may not have viewed the results before they are released to wripl.  If you have questions regarding your results contact the physician who ordered the test/exam by phone or via wripl by choosing \"Ask a Medical Question.\"  ENDOSCOPY DISCHARGE INSTRUCTIONS    Procedure Performed:   Colonoscopy and Polypectomy    Endoscopist: No name on file  FINDINGS:   Colon polyp(s) (a growth in the colon)    MEDICATIONS:  You may resume all other medications today and do not resume Eliquis until tomorrow    DIET:  Resume Normal Diet    BIOPSIES:  Biopsy results will be released to you as soon as they are available as is now the law. This will not allow your physician  the opportunity to go over these before they are released to you. It is not necessary to contact the office for explanation of these results. Your physician will contact you within a few business days of their release to review the findings with you    X-RAYS/LABS:   No X-rays/Labs were ordered today    ADDITIONAL RECOMMENDATIONS:        Activity for remainder of today:    REST TODAY  DO NOT drive or operate heavy machinery  DO NOT drink any alcoholic beverages  DO NOT sign any legal documents or make any important decisions    After your procedure(s):  It is not unusual to feel bloated or gassy .  Passing gas and belching is encouraged. Lying on your left side with your knees flexed may relieve the discomfort. A hot pack to the abdomen may also help.    After your gastroscopy (upper endoscopy): You may experience a slight sore throat which will subside. Throat lozenges or salt water gargle can be used.    FOLLOW-UP:  Contact the office at [phone] for follow-up appointment is needed or if you develop any of the following:    Severe abdominal pain/discomfort     Excessive bleeding                     Black tarry stool    Difficulty breathing/swallowing      Persistent nausea/vomiting  Fever above 100 degrees or chills

## 2024-07-18 NOTE — ANESTHESIA POSTPROCEDURE EVALUATION
Patient: Maryellen Alexandre    Procedure Summary       Date: 07/18/24 Room / Location: [facility] ENDOSCOPY 01 / EM ENDOSCOPY    Anesthesia Start: 0929 Anesthesia Stop: 1025    Procedure: COLONOSCOPY Diagnosis:       Hx of colonic polyp      (colon polyps)    Surgeons: Joe Burris MD Anesthesiologist: Brenda Morris CRNA    Anesthesia Type: general ASA Status: 3            Anesthesia Type: general    Vitals Value Taken Time   /75 07/18/24 1025   Temp 97.4 °F (36.3 °C) 07/18/24 1025   Pulse 81 07/18/24 1025   Resp 22 07/18/24 1025   SpO2 99 % 07/18/24 1025       [facility] AN Post Evaluation:   Patient Evaluated in PACU  Patient Participation: complete - patient participated  Level of Consciousness: sleepy but conscious  Pain Score: 0  Pain Management: adequate  Airway Patency:patent  Dental exam unchanged from preop  Yes    Cardiovascular Status: acceptable  Respiratory Status: acceptable  Postoperative Hydration acceptable    Brenda Morris CRNA  7/18/2024 10:25 AM

## 2024-08-26 DIAGNOSIS — G40.909 SEIZURE DISORDER (HCC): ICD-10-CM

## 2024-08-26 NOTE — TELEPHONE ENCOUNTER
Requested Prescriptions     Pending Prescriptions Disp Refills    LACOSAMIDE 100 MG Oral Tab [Pharmacy Med Name: LACOSAMIDE 100MG TABLETS] 180 tablet 0     Sig: TAKE 1 TABLET(100 MG) BY MOUTH TWICE DAILY       Neurology Medications [id number]/26/2024 12:54 PM   Protocol Details In person appointment or virtual visit in the past 6 mos or appointment in next 3 mos   Controlled Substance Medication Failed    This medication is a controlled substance - forward to provider to refill        Last OV: 10/17/2023  Next OV: Return in about 1 year (around 10/17/2024).     [place]/;

## 2024-08-27 RX ORDER — LACOSAMIDE 100 MG/1
100 TABLET ORAL 2 TIMES DAILY
Qty: 60 TABLET | Refills: 0 | Status: SHIPPED | OUTPATIENT
Start: 2024-08-27

## 2024-10-04 DIAGNOSIS — G40.909 SEIZURE DISORDER (HCC): ICD-10-CM

## 2024-10-04 NOTE — TELEPHONE ENCOUNTER
Pt requesting a refill of LCM 100mg.    Last office visit: 10/17/23    Next office visit: 11/7/24     Established  Follow Up Visit         Date: October 17, 2023  Patient Name: Maryellen Alexandre   MRN: [id number]  Primary physician: Andres [place] [id number]     Interval History:   --No seizures since 3/2023.  Saw Cardiology for chest pain and was cleared, described as \"thumping.\"  Lacosamide helped with mood too.  Not on ECT.      --Her toes \"buzz.\"  No numbness, tingling, pain.  Intermittent.  Some days worse than others.  All of her toes involved.       OUTPATIENT MEDICATIONS    Medications Ordered Prior to Encounter   ELIQUIS 5 MG Oral Tab, Take 1 tablet (5 mg total) by mouth 2 (two) times daily., Disp: , Rfl:   lacosamide 100 MG Oral Tab, Take 1 tablet (100 mg total) by mouth 2 (two) times daily., Disp: 180 tablet, Rfl: 2  pyridoxine 100 MG Oral Tab, Take 1 tablet (100 mg total) by mouth daily., Disp: , Rfl:   cholecalciferol 50 MCG (2000 UT) Oral Tab, Take 1 tablet (2,000 Units total) by mouth daily., Disp: , Rfl:   Probiotic Product (FLORAJEN DIGESTION OR), Take 1 capsule by mouth daily., Disp: , Rfl:   Levothyroxine Sodium 50 MCG Oral Tab, TAKE 1 TABLET BEFORE BREAKFAST, Disp: 90 tablet, Rfl: 1  Spacer/Aero-Holding Chambers (VALVED HOLDING CHAMBER) Does not apply Device, , Disp: , Rfl:   QUEtiapine Fumarate (SEROQUEL) 200 MG Oral Tab, Take 1.5 tablets (300 mg total) by mouth nightly., Disp: , Rfl:   dilTIAZem  MG Oral Capsule SR 24 Hr, Take 1 capsule (240 mg total) by mouth daily. (Patient not taking: Reported on 10/17/2023), Disp: , Rfl:   OLANZapine 5 MG Oral Tab, Take 1 tablet (5 mg total) by mouth nightly. (Patient not taking: Reported on 10/17/2023), Disp: 30 tablet, Rfl: 1  acetaminophen 500 MG Oral Tab, Take 1 tablet (500 mg total) by mouth as needed for Pain. (Patient not taking: Reported on 10/17/2023), Disp: , Rfl:      No current facility-administered medications on file prior  to visit.            PHYSICAL EXAM:           Ht 63\"   Wt 157 lb (71.2 kg)   BMI 27.81 kg/m²   General appearance: Well appearing, and in no acute distress  Skin: skin color, texture normal.  No rashes or lesions.    Head: Normocephalic, atraumatic.     Neurological exam:     Mental Status:   Attention/Concentration: intact attention on bedside test   Fund of knowledge: intact  Speech: no dysarthria or aphasia      LABS/DATA:  Component      Latest Ref [facility] 3/13/2023   WBC      4.0 - 11.0 x10(3) uL 7.4    RBC      3.80 - 5.30 x10(6)uL 4.70    Hemoglobin      12.0 - 16.0 g/dL 14.0    Hematocrit      35.0 - 48.0 % 42.7    MCV      80.0 - 100.0 fL 90.9    MCH      26.0 - 34.0 pg 29.8    MCHC      31.0 - 37.0 g/dL 32.8    RDW-SD      35.1 - 46.3 fL 47.0 (H)    RDW      11.0 - 15.0 % 14.0    Platelet Count      150.0 - 450.0 10(3)uL 254.0    Prelim Neutrophil Abs      1.50 - 7.70 x10 (3) uL 5.52    Neutrophils Absolute      1.50 - 7.70 x10(3) uL 5.52    Lymphocytes Absolute      1.00 - 4.00 x10(3) uL 1.13    Monocytes Absolute      0.10 - 1.00 x10(3) uL 0.70    Eosinophils Absolute      0.00 - 0.70 x10(3) uL 0.02    Basophils Absolute      0.00 - 0.20 x10(3) uL 0.02    Immature Granulocyte Absolute      0.00 - 1.00 x10(3) uL 0.03    Neutrophils %      % 74.4    Lymphocytes %      % 15.2    Monocytes %      % 9.4    Eosinophils %      % 0.3    Basophils %      % 0.3    Immature Granulocyte %      % 0.4    Glucose      70 - 99 mg/dL 93    Sodium      136 - 145 mmol/L 141    Potassium      3.5 - 5.1 mmol/L 4.3    Chloride      98 - 112 mmol/L 109    Carbon Dioxide, Total      21.0 - 32.0 mmol/L 27.0    ANION GAP      0 - 18 mmol/L 5    BUN      7 - 18 mg/dL 17    CREATININE      0.55 - 1.02 mg/dL 1.06 (H)    BUN/CREATININE RATIO      10.0 - 20.0  16.0    CALCIUM      8.5 - 10.1 mg/dL 9.4    CALCULATED OSMOLALITY      275 - 295 mOsm/kg 293    EGFR      >=60 mL/min/1.73m2 57 (L)    Magnesium, Serum      1.6 - 2.6 mg/dL 2.2        Legend:  (H) High  (L) Low        IMAGING:  N/A     ASSESSMENT:  The patient is a 68 year old woman with past medical history of bipolar disorder complicated by catatonia and refractory depression on ECT, hypothyroidism, peripheral neuropathy, paroxysmal atrial fibrillation on Eliquis who presented with new onset seizure, described as generalized tonic-clonic activity with tongue bite followed by postictal confusion.  - Her EEG is normal, somewhat unusual following generalized tonic-clonic seizure as would expect there to be some slowing at least, if not epileptiform discharges.  Discussed with family that normal EEG and MRI does not rule out seizure disorder.    - Creatinine is mildly elevated concerning for RASHIDA, normal magnesium levels, slightly elevated AST and ALT  -MRI brain essentially unremarkable     Seizure disorder also gets ECT, seizure occurred after starting ECT.    -Continue Vimpat 100 mg twice daily.  Keppra is not a good choice due to bipolar disorder and she could not tolerate Lamotrigine in the past.  If plans for ECT resume, hold Vimpat for 2 doses (night before and morning of), then resume doses.    -Seizure precautions including no driving for 6 months from last seizure.  It has been >6 months so cleared from seizure-perspective.       Abnormal sensation in toes intermittent, not consistent with neuropathy symptoms  -Discussed B6 toxicity can lead to neuropathy (usually sensory neuronopathy however) - may be reasonable to consider stopping the supplement      Follow up in 1 year      Discussed indication, administration, dose, and side effects with patient of any medications personally prescribed. Patient was advised to let my office know if they have any questions or concerns.         Today, I personally spent 10 minutes in this case, including chart review, time spent with patient doing face to face evaluation w/ interview and exam and patient education, counseling, and time was spent in  patient education, counseling, and coordination of care as described above.   Issues discussed: Diagnosis and implications on future health, benefits and side effects of present and future medications, test results as well as further testing and medications required.     This note was prepared using Dragon Medical voice recognition dictation software and as a result, errors may occur. When identified, these errors have been corrected. While every attempt is made to correct errors during dictation, discrepancies may still exist     HARSHIL Gentile DO   Staff Physician, Neurology   10/17/2023  10:17 AM

## 2024-10-08 RX ORDER — LACOSAMIDE 100 MG/1
100 TABLET ORAL 2 TIMES DAILY
Qty: 180 TABLET | Refills: 3 | Status: SHIPPED | OUTPATIENT
Start: 2024-10-08 | End: 2025-10-03

## 2024-11-19 ENCOUNTER — OFFICE VISIT (OUTPATIENT)
Dept: NEUROLOGY | Facility: CLINIC | Age: 69
End: 2024-11-19
Payer: MEDICARE

## 2024-11-19 VITALS
HEART RATE: 76 BPM | OXYGEN SATURATION: 98 % | WEIGHT: 175 LBS | TEMPERATURE: 98 F | DIASTOLIC BLOOD PRESSURE: 64 MMHG | HEIGHT: 63.5 IN | SYSTOLIC BLOOD PRESSURE: 109 MMHG | BODY MASS INDEX: 30.62 KG/M2

## 2024-11-19 DIAGNOSIS — G40.909 SEIZURE DISORDER (HCC): Primary | ICD-10-CM

## 2024-11-19 DIAGNOSIS — G62.9 NEUROPATHY: ICD-10-CM

## 2024-11-19 PROCEDURE — 99214 OFFICE O/P EST MOD 30 MIN: CPT | Performed by: OTHER

## 2024-11-19 RX ORDER — LACOSAMIDE 100 MG/1
100 TABLET ORAL 2 TIMES DAILY
Qty: 180 TABLET | Refills: 3 | Status: SHIPPED | OUTPATIENT
Start: 2024-11-19 | End: 2025-11-14

## 2024-11-19 RX ORDER — BETAMETHASONE VALERATE 1.2 MG/G
CREAM TOPICAL
COMMUNITY
Start: 2024-07-29

## 2024-11-19 RX ORDER — CEPHALEXIN 500 MG/1
500 CAPSULE ORAL 2 TIMES DAILY WITH MEALS
COMMUNITY
Start: 2024-06-20

## 2024-11-19 RX ORDER — LIDOCAINE 50 MG/G
OINTMENT TOPICAL
Qty: 30 G | Refills: 0 | Status: SHIPPED | OUTPATIENT
Start: 2024-11-19

## 2024-11-19 RX ORDER — QUETIAPINE FUMARATE 300 MG/1
TABLET, FILM COATED ORAL
COMMUNITY
Start: 2024-11-11

## 2024-11-19 RX ORDER — KETOCONAZOLE 20 MG/G
CREAM TOPICAL
COMMUNITY
Start: 2024-08-30

## 2024-11-19 RX ORDER — MUPIROCIN 20 MG/G
OINTMENT TOPICAL
COMMUNITY
Start: 2024-06-20

## 2024-11-19 NOTE — PROGRESS NOTES
[facility]  [address], [address]  [facility] [address]  [phone]          Established  Follow Up Visit       Date: November 19, 2024  Patient Name: Maryellen Alexandre   MRN: [id number]  Primary physician: [place] [address]    Interval History:   -- The patient is feeling well.  She is tolerating lacosamide well.  She still has neuropathy like symptoms on the dorsal aspects of her feet bilaterally but these have not spread.  She does have left lateral thigh numbness periodically.  No seizures.  Her mood is well controlled and she is no longer on ECT.      OUTPATIENT MEDICATIONS  Medications Ordered Prior to Encounter[1]      PHYSICAL EXAM:     /64   Pulse 76   Temp 98.1 °F (36.7 °C)   Ht 63.5\"   Wt 175 lb (79.4 kg)   SpO2 98%   BMI 30.51 kg/m²   General appearance: Well appearing, and in no acute distress  Skin: skin color, texture normal.  No rashes or lesions.    Head: Normocephalic, atraumatic.    Neurological exam:    Mental Status:   Attention/Concentration: intact attention on bedside test   Fund of knowledge: intact  Speech: no dysarthria or aphasia     LABS/DATA:  No new labs since 2023      IMAGING:  N/A    ASSESSMENT:  The patient is a 69 year old woman with past medical history of bipolar disorder complicated by catatonia and refractory depression on ECT, hypothyroidism, peripheral neuropathy, paroxysmal atrial fibrillation on Eliquis who presents for follow-up regarding generalized seizure and neuropathy.       Seizure disorder seizure occurred after starting ECT.  She is no longer on ECT.  We discussed longevity remaining on Vimpat.  It has helped her mood.  I recommend at least 5 years being on Vimpat and if EEG is negative at the time we can consider lowering the dose or tapering off.  -Continue Vimpat 100 mg twice daily.  - Could consider DEXA screening periodically with her primary care physician, we discussed this    Neuropathy likely  was too subtle to show up on EMG  - Trial of topical lidocaine      Discussed indication, administration, dose, and side effects with patient of any medications personally prescribed. Patient was advised to let my office know if they have any questions or concerns.       Today, I personally spent 15 minutes in this case, including chart review, time spent with patient doing face to face evaluation w/ interview and exam and patient education, counseling, and time was spent in patient education, counseling, and coordination of care as described above.   Issues discussed: Diagnosis and implications on future health, benefits and side effects of present and future medications, test results as well as further testing and medications required.    This note was prepared using Dragon Medical voice recognition dictation software and as a result, errors may occur. When identified, these errors have been corrected. While every attempt is made to correct errors during dictation, discrepancies may still exist    HARSHIL Gentile DO   Staff Physician, Neurology   11/19/2024  11:21 AM               [1]   Current Outpatient Medications on File Prior to Visit   Medication Sig Dispense Refill    QUEtiapine 300 MG Oral Tab       mupirocin 2 % External Ointment MIX WITH VASELINE AND TO THE AFFECTED AREA TO SURGICAL WOUND TWICE DAILY      ketoconazole 2 % External Cream APPLY TO ABNORMAL TOENAIL A LITTLE UNDERNEATH AND THE SKIN ON THE SIDES TWICE DAILY UNTIL HEALED      cephALEXin 500 MG Oral Cap Take 1 capsule (500 mg total) by mouth 2 (two) times daily with meals.      betamethasone 0.1 % External Cream       lacosamide 100 MG Oral Tab Take 1 tablet (100 mg total) by mouth 2 (two) times daily. 180 tablet 3    ELIQUIS 5 MG Oral Tab Take 1 tablet (5 mg total) by mouth 2 (two) times daily.      dilTIAZem  MG Oral Capsule SR 24 Hr Take 1 capsule (240 mg total) by mouth daily.      pyridoxine 100 MG Oral Tab Take 1 tablet (100 mg total) by  mouth daily.      cholecalciferol 50 MCG (2000 UT) Oral Tab Take 1 tablet (2,000 Units total) by mouth daily.      Probiotic Product (FLORAJEN DIGESTION OR) Take 1 capsule by mouth daily.      OLANZapine 5 MG Oral Tab Take 1 tablet (5 mg total) by mouth nightly. 30 tablet 1    Levothyroxine Sodium 50 MCG Oral Tab TAKE 1 TABLET BEFORE BREAKFAST 90 tablet 1    Spacer/Aero-Holding Chambers (VALVED HOLDING CHAMBER) Does not apply Device       acetaminophen 500 MG Oral Tab Take 1 tablet (500 mg total) by mouth as needed for Pain. (Patient not taking: Reported on 11/19/2024)      QUEtiapine Fumarate (SEROQUEL) 200 MG Oral Tab Take 1.5 tablets (300 mg total) by mouth nightly. (Patient not taking: Reported on 11/19/2024)       No current facility-administered medications on file prior to visit.

## 2025-01-23 ENCOUNTER — HOSPITAL ENCOUNTER (OUTPATIENT)
Facility: HOSPITAL | Age: 70
Setting detail: HOSPITAL OUTPATIENT SURGERY
Discharge: HOME | End: 2025-01-23
Attending: INTERNAL MEDICINE | Admitting: INTERNAL MEDICINE
Payer: MEDICARE

## 2025-01-23 ENCOUNTER — ANESTHESIA EVENT (OUTPATIENT)
Dept: ENDOSCOPY | Facility: HOSPITAL | Age: 70
End: 2025-01-23
Payer: MEDICARE

## 2025-01-23 ENCOUNTER — ANESTHESIA (OUTPATIENT)
Dept: ENDOSCOPY | Facility: HOSPITAL | Age: 70
End: 2025-01-23
Payer: MEDICARE

## 2025-01-23 VITALS
DIASTOLIC BLOOD PRESSURE: 40 MMHG | BODY MASS INDEX: 31.01 KG/M2 | OXYGEN SATURATION: 93 % | WEIGHT: 175 LBS | RESPIRATION RATE: 15 BRPM | HEART RATE: 78 BPM | SYSTOLIC BLOOD PRESSURE: 120 MMHG | HEIGHT: 63 IN

## 2025-01-23 DIAGNOSIS — Z86.0100 HX OF COLONIC POLYPS: ICD-10-CM

## 2025-01-23 PROCEDURE — 0DBH8ZX EXCISION OF CECUM, VIA NATURAL OR ARTIFICIAL OPENING ENDOSCOPIC, DIAGNOSTIC: ICD-10-PCS | Performed by: INTERNAL MEDICINE

## 2025-01-23 PROCEDURE — 0DBM8ZX EXCISION OF DESCENDING COLON, VIA NATURAL OR ARTIFICIAL OPENING ENDOSCOPIC, DIAGNOSTIC: ICD-10-PCS | Performed by: INTERNAL MEDICINE

## 2025-01-23 PROCEDURE — 88305 TISSUE EXAM BY PATHOLOGIST: CPT | Performed by: INTERNAL MEDICINE

## 2025-01-23 RX ORDER — SODIUM CHLORIDE, SODIUM LACTATE, POTASSIUM CHLORIDE, CALCIUM CHLORIDE 600; 310; 30; 20 MG/100ML; MG/100ML; MG/100ML; MG/100ML
INJECTION, SOLUTION INTRAVENOUS CONTINUOUS
Status: DISCONTINUED | OUTPATIENT
Start: 2025-01-23 | End: 2025-01-23

## 2025-01-23 RX ORDER — LIDOCAINE HYDROCHLORIDE 10 MG/ML
INJECTION, SOLUTION EPIDURAL; INFILTRATION; INTRACAUDAL; PERINEURAL AS NEEDED
Status: DISCONTINUED | OUTPATIENT
Start: 2025-01-23 | End: 2025-01-23 | Stop reason: SURG

## 2025-01-23 RX ORDER — NALOXONE HYDROCHLORIDE 0.4 MG/ML
0.08 INJECTION, SOLUTION INTRAMUSCULAR; INTRAVENOUS; SUBCUTANEOUS ONCE AS NEEDED
Status: DISCONTINUED | OUTPATIENT
Start: 2025-01-23 | End: 2025-01-23

## 2025-01-23 RX ADMIN — LIDOCAINE HYDROCHLORIDE 50 MG: 10 INJECTION, SOLUTION EPIDURAL; INFILTRATION; INTRACAUDAL; PERINEURAL at 10:13:00

## 2025-01-23 RX ADMIN — SODIUM CHLORIDE, SODIUM LACTATE, POTASSIUM CHLORIDE, CALCIUM CHLORIDE: 600; 310; 30; 20 INJECTION, SOLUTION INTRAVENOUS at 10:11:00

## 2025-01-23 RX ADMIN — SODIUM CHLORIDE, SODIUM LACTATE, POTASSIUM CHLORIDE, CALCIUM CHLORIDE: 600; 310; 30; 20 INJECTION, SOLUTION INTRAVENOUS at 10:50:00

## 2025-01-23 NOTE — H&P
RE-PROCEDURE UPDATE    HPI: Maryellen Alexandre is a 69 year old female. 5/2/1955.    Patient presents for a colonoscopy.    ALLERGIES: Allergies[1]    No current outpatient medications on file.     Past Medical History:    ALLERGIC RHINITIS    Arrhythmia    ASTHMA    Asthma (HCC)    Atrial fibrillation (HCC)    BACK PAIN    chronic cervical sprain    Back problem    chronic lower back pain    Bipolar I disorder (HCC)    Cancer (HCC)    SKIN CANCER ON FACE    Depression    Disorder of thyroid    Endometriosis    High cholesterol    Hx of motion sickness    IBS (irritable bowel syndrome)    Muscle weakness    uses walking stick    OTHER DISEASES    positive PPD-neg. CXR    Seizure (HCC)    last seizure march 2023    Seizure disorder (HCC)    pt states had 1 grand mal seizure 2.5 years ago    Thyroid disorder    Visual floaters    Visual impairment    glasses    Vitamin D deficiency     Past Surgical History:   Procedure Laterality Date    Colonoscopy  2005    diverticulosis, hemorrhoids, probable adhesions    Colonoscopy  07/2024    Colonoscopy N/A 03/18/2019    Procedure: COLONOSCOPY, POSSIBLE BIOPSY, POSSIBLE POLYPECTOMY 56585;  Surgeon: Joe Burris MD;  Location: [facility]    Colonoscopy  07/18/2024    Dr. Burris; colon polyps    Colonoscopy N/A 07/18/2024    Procedure: COLONOSCOPY;  Surgeon: Joe Burris MD;  Location: [facility] ENDOSCOPY    Hysterectomy  1995    [facility] for endometriosis    Knee arthroscopy Right     bone spur    Other surgical history      nasal septum repair    Other surgical history      injection tendon sheath, ligament    Other surgical history Bilateral     bunionectomy       PHYSICAL EXAM:  /62 (BP Location: Left arm)   Pulse 88   Resp 20   Ht 5' 3\" (1.6 m)   Wt 175 lb (79.4 kg)   SpO2 98%   BMI 31.00 kg/m²   LUNGS:   Clear to auscultation.  CARDIAC:   RRR, normal S1, S2; no S3 or murmur appreciated.  ABDOMEN:   Bowel sounds normoactive. Soft, no organomegaly or  masses appreciated. Nontender.    IMPRESSION: Personal history of colon polyps      PLAN:  No significant changes except as outlined above.     Colonoscopy with possible biopsy/polypectomy--the planned endoscopic procedure, indications, risks, benefits and post-op precautions were discussed and questions were answered in the pre-operative area.     Joe Burris MD       [1]   Allergies  Allergen Reactions    Crocus OTHER (SEE COMMENTS)     Other reaction(s): Other (See Comments)    \"feels like I'm going to pass out for hours.\"    Saffris - \"feels like I'm going to pass out for hours.\"    Demerol HALLUCINATION    Dust WHEEZING    Mold Coughing    Nsaids OTHER (SEE COMMENTS)     Intolerance due to kidney disease    Prochlorperazine HALLUCINATION and NAUSEA AND VOMITING    Prochlorperazine Edisylate HALLUCINATION and NAUSEA AND VOMITING    Statins OTHER (SEE COMMENTS)     Weakness    Extreme fatigue \"I can't get off the couch\"    Estrogens OTHER (SEE COMMENTS)     Weak      Ezetimibe RASH

## 2025-01-23 NOTE — ANESTHESIA PREPROCEDURE EVALUATION
Anesthesia PreOp Note    HPI:     Maryellen Alexandre is a 69 year old female who presents for preoperative consultation requested by: Joe Burris MD    Date of Surgery: 1/23/2025    Procedure(s):  COLONOSCOPY  Indication: Hx of colonic polyps    Relevant Problems   No relevant active problems       NPO:  Last Liquid Consumption Date: 01/23/25  Last Liquid Consumption Time: 0430  Last Solid Consumption Date: 01/21/25  Last Solid Consumption Time: 1800  Last Liquid Consumption Date: 01/23/25          History Review:  Patient Active Problem List    Diagnosis Date Noted    Seizure disorder (HCC) 03/12/2023    Depression, unspecified depression type 03/12/2023    Generalized anxiety disorder with panic attacks 08/07/2022    Bipolar I disorder, most recent episode depressed, severe w psychosis (HCC) 06/27/2022    Anxiety with somatization 06/27/2022    Suicidal ideation 06/27/2022    Hypokalemia 06/26/2022    Positive colorectal cancer screening using Cologuard test 03/06/2019    Hypothyroidism 02/14/2017    Decreased GFR 02/14/2017    Hyperlipidemia 02/14/2017    Seasonal affective disorder (HCC) 01/03/2017    S/P total abdominal hysterectomy 10/20/2016    Osteopenia 10/20/2016    Unspecified asthma(493.90) 04/13/2009    Moderate depressed bipolar I disorder (HCC) 04/13/2009       Past Medical History:    ALLERGIC RHINITIS    Arrhythmia    ASTHMA    Asthma (HCC)    Atrial fibrillation (HCC)    BACK PAIN    chronic cervical sprain    Back problem    chronic lower back pain    Bipolar I disorder (HCC)    Cancer (HCC)    SKIN CANCER ON FACE    Depression    Disorder of thyroid    Endometriosis    High cholesterol    Hx of motion sickness    IBS (irritable bowel syndrome)    Muscle weakness    uses walking stick    OTHER DISEASES    positive PPD-neg. CXR    Seizure (HCC)    last seizure march 2023    Seizure disorder (HCC)    pt states had 1 grand mal seizure 2.5 years ago    Thyroid disorder    Visual floaters    Visual  impairment    glasses    Vitamin D deficiency       Past Surgical History:   Procedure Laterality Date    Colonoscopy  2005    diverticulosis, hemorrhoids, probable adhesions    Colonoscopy  07/2024    Colonoscopy N/A 03/18/2019    Procedure: COLONOSCOPY, POSSIBLE BIOPSY, POSSIBLE POLYPECTOMY 24746;  Surgeon: Joe Burris MD;  [facility]    Colonoscopy  07/18/2024    Dr. Burris; colon polyps    Colonoscopy N/A 07/18/2024    Procedure: COLONOSCOPY;  Surgeon: Joe Burris MD;  Location: [facility] ENDOSCOPY    Hysterectomy  1995    [facility] for endometriosis    Knee arthroscopy Right     bone spur    Other surgical history      nasal septum repair    Other surgical history      injection tendon sheath, ligament    Other surgical history Bilateral     bunionectomy       Prescriptions Prior to Admission[1]  Current Medications and Prescriptions Ordered in Epic[2]    Allergies[3]    Family History   Problem Relation Age of Onset    Diabetes Father     Stroke Father         CVA    Other (Kidney Stone) Father     Lung Disorder Mother         emphysema    Heart Disease Mother         CAD    Breast Cancer Mother         dx in 70's    Other (COPD) Mother         copd-tob    Hypertension Brother     Bipolar Disorder Brother         bipolar-manic    Thyroid Disorder Brother     Schizophrenia Brother     Arthritis Brother     Other (Sleep Apnea) Brother      Social History     Socioeconomic History    Marital status:    Occupational History    Occupation: nurse-disability   Tobacco Use    Smoking status: Never     Passive exposure: Never    Smokeless tobacco: Never   Vaping Use    Vaping status: Never Used   Substance and Sexual Activity    Alcohol use: No     Alcohol/week: 0.0 standard drinks of alcohol    Drug use: No    Sexual activity: Yes     Partners: Male     Birth control/protection: Hysterectomy   Other Topics Concern    Caffeine Concern No       Available pre-op labs reviewed.              Vital Signs:  Body mass index is 31 kg/m².   height is 1.6 m (5' 3\") and weight is 79.4 kg (175 lb).   Vitals:    01/16/25 1705   Weight: 79.4 kg (175 lb)   Height: 1.6 m (5' 3\")        Anesthesia Evaluation     Patient summary reviewed and Nursing notes reviewed    Airway   Mallampati: III  TM distance: >3 FB  Neck ROM: full  Dental - Dentition appears grossly intact     Pulmonary     breath sounds clear to auscultation  (+) asthma  Cardiovascular     Rhythm: regular  Rate: normal    Neuro/Psych    (+)  seizures,  bipolar disorder, depression      GI/Hepatic/Renal      Endo/Other    Abdominal     Abdomen: soft.  Bowel sounds: normal.           NO SZ FOR 2 YEARS.       Anesthesia Plan:   ASA:  3  Plan:   MAC  Informed Consent Plan and Risks Discussed With:  Patient and spouse  Discussed plan with:  Surgeon      I have informed Maryellen Aaronaney and/or legal guardian or family member of the nature of the anesthetic plan, benefits, risks including possible dental damage if relevant, major complications, and any alternative forms of anesthetic management.   All of the patient's questions were answered to the best of my ability. The patient desires the anesthetic management as planned.  Jacinda Gonzalez CRNA  1/23/2025 9:52 AM  Present on Admission:  **None**           [1]   Medications Prior to Admission   Medication Sig Dispense Refill Last Dose/Taking    QUEtiapine 300 MG Oral Tab    Taking    lacosamide 100 MG Oral Tab Take 1 tablet (100 mg total) by mouth 2 (two) times daily. 180 tablet 3 Taking    ELIQUIS 5 MG Oral Tab Take 1 tablet (5 mg total) by mouth 2 (two) times daily.   Taking    dilTIAZem  MG Oral Capsule SR 24 Hr Take 1 capsule (240 mg total) by mouth daily.   Taking    pyridoxine 100 MG Oral Tab Take 1 tablet (100 mg total) by mouth daily.   Taking    cholecalciferol 50 MCG (2000 UT) Oral Tab Take 1.5 tablets (3,000 Units total) by mouth daily.   Taking    Probiotic Product (FLORAJEN DIGESTION  OR) Take 1 capsule by mouth daily.   Taking    OLANZapine 5 MG Oral Tab Take 1 tablet (5 mg total) by mouth nightly. 30 tablet 1 Taking    Levothyroxine Sodium 50 MCG Oral Tab TAKE 1 TABLET BEFORE BREAKFAST 90 tablet 1 Taking    mupirocin 2 % External Ointment MIX WITH VASELINE AND TO THE AFFECTED AREA TO SURGICAL WOUND TWICE DAILY       ketoconazole 2 % External Cream APPLY TO ABNORMAL TOENAIL A LITTLE UNDERNEATH AND THE SKIN ON THE SIDES TWICE DAILY UNTIL HEALED       betamethasone 0.1 % External Cream        lidocaine 5 % External Ointment -Apply thin layer to affected area up to three times daily as needed for pain. -Use gloves when you apply Lidocaine cream or gel so you do not make your hand numb. -If using on feet, be careful with walking because numb feet can cause you to trip. 30 g 0     acetaminophen 500 MG Oral Tab Take 1 tablet (500 mg total) by mouth as needed for Pain. (Patient not taking: Reported on 11/19/2024)       Spacer/Aero-Holding Chambers (VALVED HOLDING CHAMBER) Does not apply Device       [2]   Current Facility-Administered Medications Ordered in Epic   Medication Dose Route Frequency Provider Last Rate Last Admin    lactated ringers infusion   Intravenous Continuous Joe Burris MD         No current [facility]-ordered outpatient medications on file.   [3]   Allergies  Allergen Reactions    Crocus OTHER (SEE COMMENTS)     Other reaction(s): Other (See Comments)    \"feels like I'm going to pass out for hours.\"    Saffris - \"feels like I'm going to pass out for hours.\"    Demerol HALLUCINATION    Dust WHEEZING    Mold Coughing    Nsaids OTHER (SEE COMMENTS)     Intolerance due to kidney disease    Prochlorperazine HALLUCINATION and NAUSEA AND VOMITING    Prochlorperazine Edisylate HALLUCINATION and NAUSEA AND VOMITING    Statins OTHER (SEE COMMENTS)     Weakness    Extreme fatigue \"I can't get off the couch\"    Estrogens OTHER (SEE COMMENTS)     Weak      Ezetimibe RASH

## 2025-01-23 NOTE — DISCHARGE INSTRUCTIONS
Home Care Instructions for Colonoscopy  Diet:  - Resume your regular diet as tolerated unless otherwise instructed.  - Start with light meals to minimize bloating.  - Do not drink alcohol today.    Medication:  - If you have questions about resuming your normal medications, please contact your Primary Care Physician.  - ELIQUIS TODAY    Activities:  - Take it easy today. Do not return to work today.  - Do not drive today.  - Do not operate any machinery today (including kitchen equipment).    Colonoscopy:  - You may notice some rectal \"spotting\" (a little blood on the toilet tissue) for a day or two after the exam. This is normal.  - If you experience any rectal bleeding (not spotting), persistent tenderness or sharp severe abdominal pains, oral temperature over 100 degrees Fahrenheit, light-headedness or dizziness, or any other problems, contact your doctor.      **If unable to reach your doctor, please go to the [facility] Emergency Room**    - Your referring physician will receive a full report of your examination.  - If you do not hear from your doctor's office within two weeks of your biopsy, please call them for your results.    You may be able to see your laboratory results in [facility] between 4 and 7 business days.  In some cases, your physician may not have viewed the results before they are released to [facility].  If you have questions regarding your results contact the physician who ordered the test/exam by phone or via [facility] by choosing \"Ask a Medical Question.\"

## 2025-01-23 NOTE — OPERATIVE REPORT
COLONOSCOPY REPORT    Patient Name:  Maryellen Alexandre  Medical Record #: [id number]  YOB: 1955  Date of Procedure: 1/23/2025    Referring physician: RUY RBUIN    Surgeon:  Joe Burris MD    Pre-op diagnosis: 6-month follow-up after endoscopic mucosal resection of large cecal polyps    Post-op diagnosis: Partial regrowth of larger cecal polyp    Medications given:  MAC    Procedure:    After informed consent, discussion of risks and alternatives the patient was placed in the left lateral decubitus position and the high definition colonoscope was introduced into the rectum and advanced under direct visualization to the ileum.  Bowel preparation was excellent.  The mucosa was carefully inspected upon withdrawal of the scope.  Withdrawal time was 10 minutes.  ASA class was 2.    Findings:   The ileal mucosa was normal. . The visualized colonic mucosa was normal with the exception of the findings below.   A tiny hyperplastic descending colon polyp was cold snare excised and discarded.  In the cecum we saw scar deformity where 1 polyp was removed and to the medial side behind a fold in the cecum was regrowth of the larger cecal polyp.  It was sessile and 18 x 10 mm in size.  It was piecemeal hot snare excised without complication..  There was no evidence of ulcerations, colitis, vascular anomalies, diverticulosis or mass lesions.   Digital rectal exam was normal.    Plan:  High fiber diet  Await biopsy results    Specimens: polyp  EBL: minimal    Aronchick bowel prep scale:  5 Inadequate (repeat preparation needed)  4 Poor (semisolid stool could not be suctioned and <90% of mucosa seen)  3 Fair (semisolid stool could not be suctioned, but >90% of mucosa seen)  2 Good (clear liquid covering up to 25% of mucosa, but >90% of mucosa seen)  1 Excellent (>95% of mucosa seen)

## 2025-01-23 NOTE — ANESTHESIA POSTPROCEDURE EVALUATION
Patient: Maryellen Alexandre    Procedure Summary       Date: 01/23/25 Room / Location: [facility] ENDOSCOPY 01 / [facility] ENDOSCOPY    Anesthesia Start: 1008 Anesthesia Stop: 1042    Procedure: COLONOSCOPY Diagnosis:       Hx of colonic polyps      (polyp)    Surgeons: Joe Burris MD Anesthesiologist: Jacinda Gonzalez CRNA    Anesthesia Type: MAC ASA Status: 3            Anesthesia Type: MAC    Vitals Value Taken Time   BP 97/57 01/23/25 1046   Temp 97.0   01/23/25 1051   Pulse 78 01/23/25 1051   Resp 18 01/23/25 1051   SpO2 93 % 01/23/25 1051   Vitals shown include unfiled device data.    [facility] AN Post Evaluation:   Patient Evaluated in PACU  Patient Participation: complete - patient participated  Level of Consciousness: awake  Pain Score: 0  Pain Management: adequate  Airway Patency:patent  Yes    Nausea/Vomiting: none  Cardiovascular Status: acceptable and blood pressure returned to baseline  Respiratory Status: acceptable  Postoperative Hydration acceptable      Jacinda Gonzalez CRNA  1/23/2025 10:51 AM

## 2025-06-01 DIAGNOSIS — G40.909 SEIZURE DISORDER (HCC): ICD-10-CM

## 2025-06-02 NOTE — TELEPHONE ENCOUNTER
The patient is requesting a refill on:  LACOSAMIDE 100MG TABLETS     [place]): Lacosamide     Dispensed Written Strength Quantity Refills Days Supply Provider Pharmacy   LACOSAMIDE 100MG TABLETS 05/02/2025 11/19/2024  60 each  30 [facility]   LACOSAMIDE 100MG TABLETS 04/03/2025 10/08/2024  60 each  30 [facility]...   LACOSAMIDE  100 MG TABS 04/02/2025 10/08/2024  60 tablet  30 [facility]...   LACOSAMIDE 100MG TABLETS 01/03/2025 10/08/2024  180 each  90 [facility]...       Last OV: 11/19/24  Next OV: none schedule      Established  Follow Up Visit         Date: November 19, 2024  Patient Name: Maryellen Alexandre   MRN: [id number]  Primary physician: [place] [address]     Interval History:   -- The patient is feeling well.  She is tolerating lacosamide well.  She still has neuropathy like symptoms on the dorsal aspects of her feet bilaterally but these have not spread.  She does have left lateral thigh numbness periodically.  No seizures.  Her mood is well controlled and she is no longer on ECT.        OUTPATIENT MEDICATIONS  [Medications Ordered Prior to Encounter]    [Medications Ordered Prior to Encounter]         Current Outpatient Medications on File Prior to Visit   Medication Sig Dispense Refill    QUEtiapine 300 MG Oral Tab          mupirocin 2 % External Ointment MIX WITH VASELINE AND TO THE AFFECTED AREA TO SURGICAL WOUND TWICE DAILY        ketoconazole 2 % External Cream APPLY TO ABNORMAL TOENAIL A LITTLE UNDERNEATH AND THE SKIN ON THE SIDES TWICE DAILY UNTIL HEALED        cephALEXin 500 MG Oral Cap Take 1 capsule (500 mg total) by mouth 2 (two) times daily with meals.        betamethasone 0.1 % External Cream          lacosamide 100 MG Oral Tab Take 1 tablet (100 mg total) by mouth 2 (two) times daily. 180 tablet 3     ELIQUIS 5 MG Oral Tab Take 1 tablet (5 mg total) by mouth 2 (two) times daily.        dilTIAZem  MG Oral Capsule SR 24 Hr Take 1 capsule (240 mg total) by mouth daily.        pyridoxine 100 MG Oral Tab Take 1 tablet (100 mg total) by mouth daily.        cholecalciferol 50 MCG (2000 UT) Oral Tab Take 1 tablet (2,000 Units total) by mouth daily.        Probiotic Product (FLORAJEN DIGESTION OR) Take 1 capsule by mouth daily.        OLANZapine 5 MG Oral Tab Take 1 tablet (5 mg total) by mouth nightly. 30 tablet 1    Levothyroxine Sodium 50 MCG Oral Tab TAKE 1 TABLET BEFORE BREAKFAST 90 tablet 1    Spacer/Aero-Holding Chambers (VALVED HOLDING CHAMBER) Does not apply Device          acetaminophen 500 MG Oral Tab Take 1 tablet (500 mg total) by mouth as needed for Pain. (Patient not taking: Reported on 11/19/2024)        QUEtiapine Fumarate (SEROQUEL) 200 MG Oral Tab Take 1.5 tablets (300 mg total) by mouth nightly. (Patient not taking: Reported on 11/19/2024)          No current facility-administered medications on file prior to visit.           PHYSICAL EXAM:              /64   Pulse 76   Temp 98.1 °F (36.7 °C)   Ht 63.5\"   Wt 175 lb (79.4 kg)   SpO2 98%   BMI 30.51 kg/m²   General appearance: Well appearing, and in no acute distress  Skin: skin color, texture normal.  No rashes or lesions.    Head: Normocephalic, atraumatic.     Neurological exam:     Mental Status:   Attention/Concentration: intact attention on bedside test   Fund of knowledge: intact  Speech: no dysarthria or aphasia      LABS/DATA:  No new labs since 2023        IMAGING:  N/A     ASSESSMENT:  The patient is a 69 year old woman with past medical history of bipolar disorder complicated by catatonia and refractory depression on ECT, hypothyroidism, peripheral neuropathy, paroxysmal atrial fibrillation on Eliquis who presents for follow-up regarding generalized seizure and neuropathy.       Seizure disorder seizure occurred after  starting ECT.  She is no longer on ECT.  We discussed longevity remaining on Vimpat.  It has helped her mood.  I recommend at least 5 years being on Vimpat and if EEG is negative at the time we can consider lowering the dose or tapering off.  -Continue Vimpat 100 mg twice daily.  - Could consider DEXA screening periodically with her primary care physician, we discussed this     Neuropathy likely was too subtle to show up on EMG  - Trial of topical lidocaine

## 2025-06-03 RX ORDER — LACOSAMIDE 100 MG/1
100 TABLET ORAL 2 TIMES DAILY
Qty: 180 TABLET | Refills: 0 | Status: SHIPPED | OUTPATIENT
Start: 2025-06-03

## 2025-07-07 ENCOUNTER — APPOINTMENT (OUTPATIENT)
Dept: GENERAL RADIOLOGY | Age: 70
End: 2025-07-07
Attending: NURSE PRACTITIONER
Payer: MEDICARE

## 2025-07-07 ENCOUNTER — HOSPITAL ENCOUNTER (OUTPATIENT)
Age: 70
Discharge: HOME | End: 2025-07-07
Payer: MEDICARE

## 2025-07-07 VITALS
RESPIRATION RATE: 16 BRPM | DIASTOLIC BLOOD PRESSURE: 53 MMHG | OXYGEN SATURATION: 98 % | HEART RATE: 88 BPM | TEMPERATURE: 98 F | SYSTOLIC BLOOD PRESSURE: 122 MMHG

## 2025-07-07 DIAGNOSIS — G56.02 LEFT CARPAL TUNNEL SYNDROME: Primary | ICD-10-CM

## 2025-07-07 PROCEDURE — 99213 OFFICE O/P EST LOW 20 MIN: CPT

## 2025-07-07 PROCEDURE — 99214 OFFICE O/P EST MOD 30 MIN: CPT

## 2025-07-07 PROCEDURE — 73130 X-RAY EXAM OF HAND: CPT | Performed by: RADIOLOGY

## 2025-07-07 NOTE — DISCHARGE INSTRUCTIONS
Use wrist splint daily to help with pain, movement. Amelia at night.    Take tylenol 650-1000mg as needed for every 6 hours     To schedule an appointment with the Orthopedic and Sports Medicine department; please text or call [phone] and choose option 3 when prompted.  -If your insurance requires a referral, please contact your primary care provider first.     Use ICE 15 min on 2 hours off on area of most pain/swelling of hand wrist.    GO TO ED for pain out of proportion despite medication use, fevers > 101, worsening swelling or redness, chest pain or shortness of breath, dizziness.

## 2025-07-07 NOTE — ED PROVIDER NOTES
Patient Seen in: [facility]        History  Chief Complaint   Patient presents with    Hand Pain     Stated Complaint: Hand Pain    Subjective:   HPI    70-year-old female here for evaluation of left hand numbness has been ongoing for 2 weeks.  She reports folding socks prior to this starting.  Reports some pain, radiating up her arm at times.  Reports numbness and pain is worse at night.  Denies fever or chills, injury or falls, trauma to the neck head or arm prior to this starting.  Patient is left-handed. PT does not feel this in right hand/arm. She has been using topical lidocaine 5% gel for pain, tylenol has not been helping. On Eliquis.      Objective:     No pertinent past medical history.            No pertinent past surgical history.              No pertinent social history.            Review of Systems    Positive for stated complaint: Hand Pain  Other systems are as noted in HPI.  Constitutional and vital signs reviewed.      All other systems reviewed and negative except as noted above.                  Physical Exam    ED Triage Vitals [07/07/25 0912]   /53   Pulse 88   Resp 16   Temp 97.7 °F (36.5 °C)   Temp src Oral   SpO2 98 %   O2 Device None (Room air)       Current Vitals:   Vital Signs  BP: 122/53  Pulse: 88  Resp: 16  Temp: 97.7 °F (36.5 °C)  Temp src: Oral    Oxygen Therapy  SpO2: 98 %  O2 Device: None (Room air)            Physical Exam  Vitals and nursing note reviewed.   Constitutional:       General: She is not in acute distress.     Appearance: Normal appearance. She is not ill-appearing or toxic-appearing.   HENT:      Head: Normocephalic.   Cardiovascular:      Rate and Rhythm: Normal rate.      Pulses: Normal pulses.   Pulmonary:      Effort: Pulmonary effort is normal. No respiratory distress.   Musculoskeletal:      Left shoulder: Normal.      Left upper arm: Normal.      Left elbow: Normal.      Left forearm: Normal.      Left wrist: Normal. No swelling or  tenderness. Normal range of motion.      Left hand: Tenderness (diffuse) present. No swelling, deformity or bony tenderness. Normal range of motion. Normal strength. Normal sensation. Normal capillary refill. Normal pulse.      Cervical back: No swelling, edema, rigidity, tenderness, bony tenderness or crepitus. No pain with movement. Normal range of motion.   Neurological:      Mental Status: She is alert and oriented to person, place, and time.   Psychiatric:         Mood and Affect: Mood normal.         Behavior: Behavior normal.               ED Course  Labs Reviewed - No data to display    ED Course as of 07/08/25 1251  ------------------------------------------------------------  Time: 07/07 1107  Value: XR HAND (MIN 3 VIEWS), LEFT (CPT=73130)  Comment: CONCLUSION:   No acute fracture or dislocation.                        MDM    70 yr old female here for left hand pain and numbness x 2 weeks. Denies fall or trauma. Denies neck pain, back pain, arms pain or injury prior to this starting.    ON exam, pt well appearing, breathing easy. Sitting in wheelchair with cane in lap. Full ROM to left hand fingers, making fist, flaring fingers and making ok sign with no increased distress. Radial and brachial pulse normal. Skin warm and dry with no obvious erythema, rash, bruising. Mild swelling around snuff box/thenar eminence with pain to this area. Non-tender wrist. PT reports pain with flexion.     Differential diagnoses reflecting the complexity of care include but are not limited to cervical radiculopathy, carpel tunnel syndrome, wrist sprain, hand sprain, ulnar tunnel syndrome.    Comorbidities that add complexity to management include: Muscle weakness, IBS, HLD, seizure, Afib, asthma, CA hx, depression, thyroid  History obtained by an independent source was from: patient   My independent interpretations of studies include: XR hand  Shared decision making was done by: pt, myself  Discussions of management  was done with: patient,     Patient is well appearing, non-toxic and in no acute distress.  Vital signs are stable.   Discussed likely carpel tunnel causing symptoms. Advised on wrist splint, fu with ortho.  PT reports pain sometimes in thenar eminence and snuff box so would like xray.  Placed.  XR discussed, No obvious fractures, acute finding on XR.  Wrist splint placed. Discussed ice, use of topical gel she has been doing or tylenol prn.  Ortho given for follow up.  All questions answered. Return and ER precautions given.    Counseled: Patient, regarding diagnosis, regarding treatment plan, regarding diagnostic results, regarding prescription, I have discussed with the patient the results of tests, differential diagnosis, and warning signs and symptoms that should prompt immediate return. The patient understands these instructions and agrees to the follow-up plan provided. There is no barriers to learning. Appropriate f/u given. Patient agrees to return for any concerns/ problems/complications.              Medical Decision Making      Disposition and Plan     Clinical Impression:  1. Left carpal tunnel syndrome         Disposition:  Discharge  7/7/2025 11:14 am    Follow-up:  Suzanne Luna [facility] [id number] [phone]      As needed          Medications Prescribed:  Discharge Medication List as of 7/7/2025 11:14 AM                Supplementary Documentation:

## 2025-07-21 ENCOUNTER — TELEPHONE (OUTPATIENT)
Dept: PHYSICAL THERAPY | Facility: HOSPITAL | Age: 70
End: 2025-07-21

## 2025-07-21 ENCOUNTER — OFFICE VISIT (OUTPATIENT)
Age: 70
End: 2025-07-21
Payer: MEDICARE

## 2025-07-21 DIAGNOSIS — G56.02 CARPAL TUNNEL SYNDROME OF LEFT WRIST: Primary | ICD-10-CM

## 2025-07-21 PROCEDURE — 99203 OFFICE O/P NEW LOW 30 MIN: CPT | Performed by: STUDENT IN AN ORGANIZED HEALTH CARE EDUCATION/TRAINING PROGRAM

## 2025-07-21 NOTE — PROGRESS NOTES
Clinic Note        Allergies[1]    CC: left hand numbness and tingling    History of Present Illness  Maryellen Alexandre is a 70 year old female who presents with numbness and tingling in the left hand. She is accompanied by her , Lj.    Numbness primarily affects the left hand, with occasional extension to the thumb and index finger. The sensation in the fingers is diminished compared to normal. Occasionally all fingers are affected.    Symptoms began a few weeks ago. Lidocaine cream provides temporary relief. Initially, a small brace worsened symptoms, but a different brace worn at night has been helpful. She does not shake her hands out at night and only wears the brace during the day if necessary.    No hendrix or shooting pain to the fingers. Over the past few weeks, there has been no significant change in symptoms.    She has been doing some exercises at home, although she feels she lacks the right equipment.    No shooting pain to the fingers and no hendrix when tapping on the elbow.      Past Medical History[2]  Past Surgical History[3]  Current Medications[4]  Family History[5]  Social History     Occupational History    Occupation: nurse-disability   Tobacco Use    Smoking status: Never     Passive exposure: Never    Smokeless tobacco: Never   Vaping Use    Vaping status: Never Used   Substance and Sexual Activity    Alcohol use: No     Alcohol/week: 0.0 standard drinks of alcohol    Drug use: No    Sexual activity: Yes     Partners: Male     Birth control/protection: Hysterectomy        Review of Systems:  Negative unless stated in HPI    Physical Exam:        Constitutional: NAD. AOx3. Well-developed and Well-nourished.   Psychiatric: Normal mood/ affect/ behavior. Judgment and thought content normal.     Left Upper Extremity:   Inspection: Skin Intact. No skin lesions. No gross deformity.   Palpation: No focal areas of TTP   Motion: Elbow: normal bilateral symmetric ext/flex  Wrist: normal  bilateral symmetric ext/flex/sup/pro  Fingers: able to make a full composite fist   Vascular Brisk capillary refill in all digits       Median Nerve Exam Right Left   Durkan's  +   Tinel's at Wrist  +   Sensation  abnormal: diminished, subjectively   PCBr Sensation  normal   APB Weakness  No   Thenar Atrophy  No   FDP to index finger weakness  No       Ulnar Nerve Exam Right Left   Sensation  normal   Tinel's at Medial Elbow  neg   Elbow Flexion, Nerve Compression Testing  neg   1st SOLE Weakness  No   Hypothenar Atrophy  No   FDP to small finger weakness  No     Radial Nerve Exam  Right Left   Radial Sensory  normal       Diagnostic Studies:  Prior xrays of left hand from 7/7/25 reveal no acute bony abnormalities.    Assessment/Plan:  70 year old female with likely left carpal tunnel syndrome.    I reviewed the patient's electronic medical record, including the pertinent office notes, medical/surgical history, medications and images.    left carpal tunnel syndrome    We had an extensive discussion today regarding the nature of a presumptive compressive neuropathy, and specifically the nature of carpal tunnel syndrome including signs and symptoms of the condition, general considerations for causation and progression of the condition, and strategies for treatment. The nature of progressive nerve dysfunction including reversible and irreversible changes have been discussed, along with the influence of timing on treatment considerations and prognosis for recovery. The potential for other or secondary sources of nerve compression or other nerve involvement have been reviewed. The indications and nature of surgical versus nonsurgical options have been discussed, as well as the inherent risks and benefits of each treatment. The patient verbalizes understanding of our discussion.     Continue wrist brace use at night. Patient wishes to defer EMG testing at this time. OT referral provided as well. Patient agreeable to plan  of care. All questions answered.     Follow Up: 6-8 weeks    Romel Garner MD   Hand, Wrist, & Elbow Surgery  [email or web address]       [1]   Allergies  Allergen Reactions    Crocus OTHER (SEE COMMENTS)     Other reaction(s): Other (See Comments)    \"feels like I'm going to pass out for hours.\"    Saffris - \"feels like I'm going to pass out for hours.\"    Demerol HALLUCINATION    Dust WHEEZING    Mold Coughing    Nsaids OTHER (SEE COMMENTS)     Intolerance due to kidney disease    Prochlorperazine HALLUCINATION and NAUSEA AND VOMITING    Prochlorperazine Edisylate HALLUCINATION and NAUSEA AND VOMITING    Statins OTHER (SEE COMMENTS)     Weakness    Extreme fatigue \"I can't get off the couch\"    Estrogens OTHER (SEE COMMENTS)     Weak      Ezetimibe RASH   [2]   Past Medical History:   ALLERGIC RHINITIS    Arrhythmia    ASTHMA    Asthma (HCC)    Atrial fibrillation (HCC)    BACK PAIN    chronic cervical sprain    Back problem    chronic lower back pain    Bipolar I disorder (HCC)    Cancer (HCC)    SKIN CANCER ON FACE    Depression    Disorder of thyroid    Endometriosis    High cholesterol    Hx of motion sickness    IBS (irritable bowel syndrome)    Muscle weakness    uses walking stick    OTHER DISEASES    positive PPD-neg. CXR    Seizure (HCC)    last seizure march 2023    Seizure disorder (HCC)    pt states had 1 grand mal seizure 2.5 years ago    Thyroid disorder    Visual floaters    Visual impairment    glasses    Vitamin D deficiency   [3]   Past Surgical History:  Procedure Laterality Date    Colonoscopy  2005    diverticulosis, hemorrhoids, probable adhesions    Colonoscopy  07/2024    Colonoscopy N/A 03/18/2019    Procedure: COLONOSCOPY, POSSIBLE BIOPSY, POSSIBLE POLYPECTOMY 93379;  Surgeon: Joe Burris MD;  Location: [facility] SURGICAL [facility]    Colonoscopy  07/18/2024    Dr. Burris; colon polyps    Colonoscopy N/A 07/18/2024    Procedure: COLONOSCOPY;  Surgeon: Joe Burris  MD;  Location: [facility] ENDOSCOPY    Colonoscopy N/A 1/23/2025    Procedure: COLONOSCOPY;  Surgeon: Joe Burris MD;  Location: [facility] ENDOSCOPY    Hysterectomy  1995    [facility] for endometriosis    Knee arthroscopy Right     bone spur    Other surgical history      nasal septum repair    Other surgical history      injection tendon sheath, ligament    Other surgical history Bilateral     bunionectomy   [4]   Current Outpatient Medications   Medication Sig Dispense Refill    LACOSAMIDE 100 MG Oral Tab TAKE 1 TABLET(100 MG) BY MOUTH TWICE DAILY 180 tablet 0    QUEtiapine 300 MG Oral Tab       mupirocin 2 % External Ointment MIX WITH VASELINE AND TO THE AFFECTED AREA TO SURGICAL WOUND TWICE DAILY      ketoconazole 2 % External Cream APPLY TO ABNORMAL TOENAIL A LITTLE UNDERNEATH AND THE SKIN ON THE SIDES TWICE DAILY UNTIL HEALED      betamethasone 0.1 % External Cream       lidocaine 5 % External Ointment -Apply thin layer to affected area up to three times daily as needed for pain. -Use gloves when you apply Lidocaine cream or gel so you do not make your hand numb. -If using on feet, be careful with walking because numb feet can cause you to trip. 30 g 0    ELIQUIS 5 MG Oral Tab Take 1 tablet (5 mg total) by mouth in the morning and 1 tablet (5 mg total) before bedtime.      dilTIAZem  MG Oral Capsule SR 24 Hr Take 1 capsule (240 mg total) by mouth in the morning.      pyridoxine 100 MG Oral Tab Take 1 tablet (100 mg total) by mouth daily.      cholecalciferol 50 MCG (2000 UT) Oral Tab Take 1.5 tablets (3,000 Units total) by mouth daily.      Probiotic Product (FLORAJEN DIGESTION OR) Take 1 capsule by mouth daily.      OLANZapine 5 MG Oral Tab Take 1 tablet (5 mg total) by mouth nightly. 30 tablet 1    Levothyroxine Sodium 50 MCG Oral Tab TAKE 1 TABLET BEFORE BREAKFAST 90 tablet 1    Spacer/Aero-Holding Chambers (VALVED HOLDING CHAMBER) Does not apply Device      [5]   Family History  Problem Relation Age of Onset     Diabetes Father     Stroke Father         CVA    Other (Kidney Stone) Father     Lung Disorder Mother         emphysema    Heart Disease Mother         CAD    Breast Cancer Mother         dx in 70's    Other (COPD) Mother         copd-tob    Hypertension Brother     Bipolar Disorder Brother         bipolar-manic    Thyroid Disorder Brother     Schizophrenia Brother     Arthritis Brother     Other (Sleep Apnea) Brother

## 2025-07-23 ENCOUNTER — OFFICE VISIT (OUTPATIENT)
Dept: DERMATOLOGY CLINIC | Facility: CLINIC | Age: 70
End: 2025-07-23

## 2025-07-23 DIAGNOSIS — L82.1 SEBORRHEIC KERATOSIS: Primary | ICD-10-CM

## 2025-07-23 DIAGNOSIS — L21.9 SEBORRHEIC DERMATITIS: ICD-10-CM

## 2025-07-23 DIAGNOSIS — L72.0 MILIA: ICD-10-CM

## 2025-07-23 PROCEDURE — 99204 OFFICE O/P NEW MOD 45 MIN: CPT | Performed by: STUDENT IN AN ORGANIZED HEALTH CARE EDUCATION/TRAINING PROGRAM

## 2025-07-23 NOTE — PROGRESS NOTES
New Patient    Referred by: No referring provider defined for this encounter.    CHIEF COMPLAINT: Lesions of concern     HISTORY OF PRESENT ILLNESS: Maryellen Alexandre is a 70 year old female here for evaluation of lesions of concern.    1. Growths   Location: Left Axilla  Duration: 3 months  Bleeding, growing, changing?: No  Scaly?:No    Itchy?:No    Current treatment: None  Past treatments: None     2. Growths   Location: Face   Duration: 1 month  Bleeding, growing, changing?: Yes: Growing- more appearing- flaky, oily, peeling patches  Scaly?:Yes  Itchy?:No    Current treatment: None  Past treatments: None    Personal Dermatologic History  History of skin cancer: Yes: BCC (Left Nasolabial cheek)  History of  atypical moles: No    FAMILY HISTORY:  History of melanoma: Yes (Brother)    Past Medical History  Past Medical History[1]    REVIEW OF SYSTEMS:  Constitutional: Denies fever, chills, unintentional weight loss.   Skin as per HPI    Medications  Current Medications[2]    PHYSICAL EXAM:  General: awake, alert, no acute distress  Neuropsych: appropriate mood and affect  Eyes: Sclerae anicteric, without conjunctival injection, eyelids unremarkable  Skin: Skin exam was performed today including the following: L axilla . Pertinent findings include:   - L axilla with a white dome shaped papule  - L axilla with a brown stuck on papule  - Scalp with yellow greasy scale    ASSESSMENT & PLAN:  Pathophysiology of diagnoses discussed with patient.  Therapeutic options reviewed. Risks, benefits, and alternatives discussed with patient. Instructions reviewed at length.    #Seborrheic keratosis   - Reassured patient regarding benign nature of lesion. No treatment but observation at this time. Follow-up for concerning physical changes or new symptoms.    #Milia  - We discussed the diagnosis, risk/benefit of treatment options,and prognosis at length. Milia are tiny cysts that commonly occur around the eyes due to chronic rubbing.   Treatment involves destruction of the lesions (extraction) vs keratin normalization using a topical retinoid (used with variable success).   - Reassured regarding benign nature. No treatment necessary unless symptomatic/changing.    #Seborrheic dermatitis  - We discussed the etiology, natural history, and chronic, waxing/waning nature of seborrheic dermatitis. Educated patient that seborrheic dermatitis is typically a chronic problem due to inflammation in response to yeast (Malassezia species). Discussed that maintenance is desired rather than cure.    For the scalp:  - Prescribed ketoconazole 2% shampoo three times weekly, leaving in five to ten minutes before washing out. Ok to use normal shampoo and conditioner afterwards.       Return to clinic: as needed or sooner if something concerning arises     Moises Patrick MD    By signing my name below, I, Carlos Manuel CHUA MA,  attest that this documentation has been prepared under the direction and in the presence of Moises Patrick MD.   Electronically Signed: Carlos Manuel CHUA MA, 7/23/2025, 4:29 PM.    I, Moises Patrick MD,  personally performed the services described in this documentation. All medical record entries made by the scribe were at my direction and in my presence.  I have reviewed the chart and agree that the record reflects my personal performance and is accurate and complete.  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      mupirocin 2 % External Ointment MIX WITH VASELINE AND TO THE AFFECTED AREA TO SURGICAL WOUND TWICE DAILY      ketoconazole 2 % External Cream APPLY TO ABNORMAL TOENAIL A LITTLE UNDERNEATH AND THE SKIN ON THE SIDES TWICE DAILY UNTIL HEALED      betamethasone 0.1 % External Cream       lidocaine 5 % External Ointment -Apply thin layer to affected area up to three times daily as needed for pain. -Use gloves when you apply Lidocaine cream or gel so you do not make your hand numb. -If using on feet, be careful with walking because numb feet can cause you to trip. 30 g 0    ELIQUIS 5 MG Oral Tab Take 1 tablet (5 mg total) by mouth in the morning and 1 tablet (5 mg total) before bedtime. Per patient will hold 2 days prior to colonoscopy.      dilTIAZem  MG Oral Capsule SR 24 Hr Take 1 capsule (240 mg total) by mouth in the morning.      pyridoxine 100 MG Oral Tab Take 1 tablet (100 mg total) by mouth in the morning.      cholecalciferol 50 MCG (2000 UT) Oral Tab Take 1.5 tablets (3,000 Units total) by mouth in the morning.      Probiotic Product (FLORAJEN DIGESTION OR) Take 1 capsule by mouth in the morning.      OLANZapine 5 MG Oral Tab Take 1 tablet (5 mg total) by mouth nightly. 30 tablet 1    Levothyroxine Sodium 50 MCG Oral Tab TAKE 1 TABLET BEFORE BREAKFAST 90 tablet 1    Spacer/Aero-Holding Chambers (VALVED HOLDING CHAMBER) Does not apply Device

## 2025-07-25 ENCOUNTER — TELEPHONE (OUTPATIENT)
Dept: DERMATOLOGY CLINIC | Facility: CLINIC | Age: 70
End: 2025-07-25

## 2025-07-25 NOTE — TELEPHONE ENCOUNTER
LOV 7/23/25 - Office visit notes states Ketoconazole 2% Shampoo prescribed for the scalp.  Rx pended.  Thank you.

## 2025-07-28 RX ORDER — KETOCONAZOLE 20 MG/ML
SHAMPOO, SUSPENSION TOPICAL
Qty: 120 ML | Refills: 11 | Status: SHIPPED | OUTPATIENT
Start: 2025-07-28

## 2025-07-29 ENCOUNTER — ANESTHESIA (OUTPATIENT)
Dept: ENDOSCOPY | Facility: HOSPITAL | Age: 70
End: 2025-07-29
Payer: MEDICARE

## 2025-07-29 ENCOUNTER — HOSPITAL ENCOUNTER (OUTPATIENT)
Facility: HOSPITAL | Age: 70
Setting detail: HOSPITAL OUTPATIENT SURGERY
Discharge: HOME | End: 2025-07-29
Attending: INTERNAL MEDICINE | Admitting: INTERNAL MEDICINE

## 2025-07-29 ENCOUNTER — ANESTHESIA EVENT (OUTPATIENT)
Dept: ENDOSCOPY | Facility: HOSPITAL | Age: 70
End: 2025-07-29
Payer: MEDICARE

## 2025-07-29 VITALS
WEIGHT: 175 LBS | RESPIRATION RATE: 19 BRPM | HEART RATE: 83 BPM | OXYGEN SATURATION: 99 % | SYSTOLIC BLOOD PRESSURE: 124 MMHG | HEIGHT: 63 IN | DIASTOLIC BLOOD PRESSURE: 68 MMHG | BODY MASS INDEX: 31.01 KG/M2

## 2025-07-29 DIAGNOSIS — Z86.0100 HX OF COLONIC POLYPS: ICD-10-CM

## 2025-07-29 PROCEDURE — 88305 TISSUE EXAM BY PATHOLOGIST: CPT | Performed by: INTERNAL MEDICINE

## 2025-07-29 RX ORDER — LIDOCAINE HYDROCHLORIDE 10 MG/ML
INJECTION, SOLUTION EPIDURAL; INFILTRATION; INTRACAUDAL; PERINEURAL AS NEEDED
Status: DISCONTINUED | OUTPATIENT
Start: 2025-07-29 | End: 2025-07-29 | Stop reason: SURG

## 2025-07-29 RX ORDER — SODIUM CHLORIDE, SODIUM LACTATE, POTASSIUM CHLORIDE, CALCIUM CHLORIDE 600; 310; 30; 20 MG/100ML; MG/100ML; MG/100ML; MG/100ML
INJECTION, SOLUTION INTRAVENOUS CONTINUOUS
Status: DISCONTINUED | OUTPATIENT
Start: 2025-07-29 | End: 2025-07-29

## 2025-07-29 RX ORDER — SODIUM CHLORIDE, SODIUM LACTATE, POTASSIUM CHLORIDE, CALCIUM CHLORIDE 600; 310; 30; 20 MG/100ML; MG/100ML; MG/100ML; MG/100ML
INJECTION, SOLUTION INTRAVENOUS CONTINUOUS PRN
Status: DISCONTINUED | OUTPATIENT
Start: 2025-07-29 | End: 2025-07-29 | Stop reason: SURG

## 2025-07-29 RX ADMIN — SODIUM CHLORIDE, SODIUM LACTATE, POTASSIUM CHLORIDE, CALCIUM CHLORIDE: 600; 310; 30; 20 INJECTION, SOLUTION INTRAVENOUS at 09:05:00

## 2025-07-29 RX ADMIN — LIDOCAINE HYDROCHLORIDE 50 MG: 10 INJECTION, SOLUTION EPIDURAL; INFILTRATION; INTRACAUDAL; PERINEURAL at 09:08:00

## 2025-07-30 ENCOUNTER — OFFICE VISIT (OUTPATIENT)
Dept: PHYSICAL THERAPY | Age: 70
End: 2025-07-30
Attending: STUDENT IN AN ORGANIZED HEALTH CARE EDUCATION/TRAINING PROGRAM
Payer: MEDICARE

## 2025-07-30 DIAGNOSIS — G56.02 CARPAL TUNNEL SYNDROME OF LEFT WRIST: Primary | ICD-10-CM

## 2025-07-30 PROCEDURE — 97110 THERAPEUTIC EXERCISES: CPT

## 2025-07-30 PROCEDURE — 97165 OT EVAL LOW COMPLEX 30 MIN: CPT

## 2025-08-05 ENCOUNTER — OFFICE VISIT (OUTPATIENT)
Dept: PHYSICAL THERAPY | Age: 70
End: 2025-08-05
Attending: STUDENT IN AN ORGANIZED HEALTH CARE EDUCATION/TRAINING PROGRAM

## 2025-08-05 PROCEDURE — 97140 MANUAL THERAPY 1/> REGIONS: CPT

## 2025-08-05 PROCEDURE — 97110 THERAPEUTIC EXERCISES: CPT

## 2025-08-12 ENCOUNTER — OFFICE VISIT (OUTPATIENT)
Dept: PHYSICAL THERAPY | Age: 70
End: 2025-08-12
Attending: STUDENT IN AN ORGANIZED HEALTH CARE EDUCATION/TRAINING PROGRAM

## 2025-08-12 PROCEDURE — 97140 MANUAL THERAPY 1/> REGIONS: CPT

## 2025-08-12 PROCEDURE — 97110 THERAPEUTIC EXERCISES: CPT

## 2025-08-14 ENCOUNTER — APPOINTMENT (OUTPATIENT)
Dept: PHYSICAL THERAPY | Age: 70
End: 2025-08-14
Attending: STUDENT IN AN ORGANIZED HEALTH CARE EDUCATION/TRAINING PROGRAM

## 2025-08-18 ENCOUNTER — OFFICE VISIT (OUTPATIENT)
Dept: PHYSICAL THERAPY | Age: 70
End: 2025-08-18
Attending: STUDENT IN AN ORGANIZED HEALTH CARE EDUCATION/TRAINING PROGRAM

## 2025-08-18 PROCEDURE — 97110 THERAPEUTIC EXERCISES: CPT

## 2025-08-18 PROCEDURE — 97140 MANUAL THERAPY 1/> REGIONS: CPT

## 2025-08-20 ENCOUNTER — APPOINTMENT (OUTPATIENT)
Dept: PHYSICAL THERAPY | Age: 70
End: 2025-08-20
Attending: STUDENT IN AN ORGANIZED HEALTH CARE EDUCATION/TRAINING PROGRAM

## 2025-08-25 ENCOUNTER — OFFICE VISIT (OUTPATIENT)
Dept: PHYSICAL THERAPY | Age: 70
End: 2025-08-25
Attending: STUDENT IN AN ORGANIZED HEALTH CARE EDUCATION/TRAINING PROGRAM

## 2025-08-25 PROCEDURE — 97140 MANUAL THERAPY 1/> REGIONS: CPT

## 2025-08-25 PROCEDURE — 97110 THERAPEUTIC EXERCISES: CPT

## 2025-08-27 ENCOUNTER — APPOINTMENT (OUTPATIENT)
Dept: PHYSICAL THERAPY | Age: 70
End: 2025-08-27
Attending: STUDENT IN AN ORGANIZED HEALTH CARE EDUCATION/TRAINING PROGRAM

## (undated) DEVICE — LASSO POLYPECTOMY SNARE: Brand: LASSO

## (undated) DEVICE — FIAPC® PROBE W/ FILTER 2200 A OD 2.3MM/6.9FR; L 2.2M/7.2FT: Brand: ERBE

## (undated) DEVICE — KIT CLEAN ENDOKIT 1.1OZ GOWNX2

## (undated) DEVICE — MEDI-VAC NON-CONDUCTIVE SUCTION TUBING 6MM X 1.8M (6FT.) L: Brand: CARDINAL HEALTH

## (undated) DEVICE — Device

## (undated) DEVICE — NET SPEC 3X6CM TIP DIA2.5MM CATH L230CM

## (undated) DEVICE — AGENT SUBMUCOSAL LIFTING 10ML INJ POLYPR

## (undated) DEVICE — V2 SPECIMEN COLLECTION TRAY: Brand: NEPTUNE

## (undated) DEVICE — 60 ML SYRINGE REGULAR TIP: Brand: MONOJECT

## (undated) DEVICE — KIT ENDO ORCAPOD 160/180/190

## (undated) DEVICE — KIT MFLD FOR SPEC COLL

## (undated) DEVICE — TRAP POLYP W/ 2 SPEC TY CLR MAGNIFYING WIND

## (undated) DEVICE — TUBING SCT CLR 6FT .25IN MDVC

## (undated) DEVICE — SYRINGE, LUER SLIP, STERILE, 60ML: Brand: MEDLINE

## (undated) DEVICE — CO2 CANNULA,SSOFT,ADLT,7O2,4CO2,FEMALE: Brand: MEDLINE

## (undated) DEVICE — STERILE LATEX POWDER-FREE SURGICAL GLOVESWITH NITRILE COATING: Brand: PROTEXIS

## (undated) DEVICE — NEEDLE INJ 25GA CATH 230CM CHN 2.8MM ACUJECT

## (undated) DEVICE — REM POLYHESIVE ADULT PATIENT RETURN ELECTRODE: Brand: VALLEYLAB

## (undated) DEVICE — V2 SPECIMEN COLLECTION MANIFOLD KIT: Brand: NEPTUNE

## (undated) NOTE — LETTER
[facility]  Administration of Anesthesia  I, Maryellen Alexandre agree to be cared for by a physician anesthesiologist alone and/or with a nurse anesthetist, who is specially trained to monitor me and give me medicine to put me to sleep or keep me comfortable during my procedure    I understand that my anesthesiologist and/or anesthetist is not an employee or agent of [facility] or [employer]. He or she works for [facility]    As the patient asking for anesthesia services, I agree to:  Allow the anesthesiologist (anesthesia doctor) to give me medicine and do additional procedures as necessary. Some examples are: Starting or using an “IV” to give me medicine, fluids or blood during my procedure, and having a breathing tube placed to help me breathe when I’m asleep (intubation). In the event that my heart stops working properly, I understand that my anesthesiologist will make every effort to sustain my life, unless otherwise directed by [facility] Do Not Resuscitate documents.  Tell my anesthesia doctor before my procedure:  If I am pregnant.  The last time that I ate or drank.  iii. All of the medicines I take (including prescriptions, herbal supplements, and pills I can buy without a prescription (including street drugs/illegal medications). Failure to inform my anesthesiologist about these medicines may increase my risk of anesthetic complications.  iv.If I am allergic to anything or have had a reaction to anesthesia before.  I understand how the anesthesia medicine will help me (benefits).  I understand that with any type of anesthesia medicine there are risks:  The most common risks are: nausea, vomiting, sore throat, muscle soreness, damage to my eyes, mouth, or teeth (from breathing tube placement).  Rare risks include: remembering what happened during my procedure, allergic reactions to medications, injury to my airway, heart, lungs, vision, nerves, or  muscles and in extremely rare instances death.  My doctor has explained to me other choices available to me for my care (alternatives).  Pregnant Patients (“epidural”):  I understand that the risks of having an epidural (medicine given into my back to help control pain during labor), include itching, low blood pressure, difficulty urinating, headache or slowing of the baby’s heart. Very rare risks include infection, bleeding, seizure, irregular heart rhythms and nerve injury.  Regional Anesthesia (“spinal”, “epidural”, & “nerve blocks”):  I understand that rare but potential complications include headache, bleeding, infection, seizure, irregular heart rhythms, and nerve injury.    _____________________________________________________________________________  Patient (or Representative) Signature/Relationship to Patient  Date   Time    _____________________________________________________________________________   Name (if used)    Language/Organization   Time    _____________________________________________________________________________  Nurse Anesthetist Signature     Date   Time  _____________________________________________________________________________  Anesthesiologist Signature     Date   Time  I have discussed the procedure and information above with the patient (or patient’s representative) and answered their questions. The patient or their representative has agreed to have anesthesia services.    _____________________________________________________________________________  Witness        Date   Time  I have verified that the signature is that of the patient or patient’s representative, and that it was signed before the procedure  Patient Name: Maryellen Alexandre     : 1955                 Printed: 2025 at 9:33 AM    Medical Record #: [id number]                                            Page 1 of 1  ----------ANESTHESIA CONSENT----------

## (undated) NOTE — LETTER
[facility]  [address], [place], [place]    Authorization for Surgical Operation and Procedure                               I hereby authorize Joe Burris MD, my physician and his/her assistants (if applicable), which may include medical students, residents, and/or fellows, to perform the following surgical operation/ procedure and administer such anesthesia as may be determined necessary by my physician: Operation/Procedure name (s) COLONOSCOPY on Maryellen Alexandre   2.   I recognize that during the surgical operation/procedure, unforeseen conditions may necessitate additional or different procedures than those listed above.  I, therefore, further authorize and request that the above-named surgeon, assistants, or designees perform such procedures as are, in their judgment, necessary and desirable.    3.   My surgeon/physician has discussed prior to my surgery the potential benefits, risks and side effects of this procedure; the likelihood of achieving goals; and potential problems that might occur during recuperation.  They also discussed reasonable alternatives to the procedure, including risks, benefits, and side effects related to the alternatives and risks related to not receiving this procedure.  I have had all my questions answered and I acknowledge that no guarantee has been made as to the result that may be obtained.    4.   Should the need arise during my operation/procedure, which includes change of level of care prior to discharge, I also consent to the administration of blood and/or blood products.  Further, I understand that despite careful testing and screening of blood or blood products by collecting agencies, I may still be subject to ill effects as a result of receiving a blood transfusion and/or blood products.  The following are some, but not all, of the potential risks that can occur: fever and allergic reactions, hemolytic reactions, transmission of diseases such as  Hepatitis, AIDS and Cytomegalovirus (CMV) and fluid overload.  In the event that I wish to have an autologous transfusion of my own blood, or a directed donor transfusion, I will discuss this with my physician.  Check only if Refusing Blood or Blood Products  I understand refusal of blood or blood products as deemed necessary by my physician may have serious consequences to my condition to include possible death. I hereby assume responsibility for my refusal and release the hospital, its personnel, and my physicians from any responsibility for the consequences of my refusal.    o  Refuse   5.   I authorize the use of any specimen, organs, tissues, body parts or foreign objects that may be removed from my body during the operation/procedure for diagnosis, research or teaching purposes and their subsequent disposal by hospital authorities.  I also authorize the release of specimen test results and/or written reports to my treating physician on the hospital medical staff or other referring or consulting physicians involved in my care, at the discretion of the Pathologist or my treating physician.    6.   I consent to the photographing or videotaping of the operations or procedures to be performed, including appropriate portions of my body for medical, scientific, or educational purposes, provided my identity is not revealed by the pictures or by descriptive texts accompanying them.  If the procedure has been photographed/videotaped, the surgeon will obtain the original picture, image, videotape or CD.  The hospital will not be responsible for storage, release or maintenance of the picture, image, tape or CD.    7.   I consent to the presence of a  or observers in the operating room as deemed necessary by my physician or their designees.    8.   I recognize that in the event my procedure results in extended X-Ray/fluoroscopy time, I may develop a skin reaction.    9. If I have a Do Not Attempt  Resuscitation (DNAR) order in place, that status will be suspended while in the operating room, procedural suite, and during the recovery period unless otherwise explicitly stated by me (or a person authorized to consent on my behalf). The surgeon or my attending physician will determine when the applicable recovery period ends for purposes of reinstating the DNAR order.  10. Patients having a sterilization procedure: I understand that if the procedure is successful the results will be permanent and it will therefore be impossible for me to inseminate, conceive, or bear children.  I also understand that the procedure is intended to result in sterility, although the result has not been guaranteed.   11. I acknowledge that my physician has explained sedation/analgesia administration to me including the risk and benefits I consent to the administration of sedation/analgesia as may be necessary or desirable in the judgment of my physician.    I CERTIFY THAT I HAVE READ AND FULLY UNDERSTAND THE ABOVE CONSENT TO OPERATION and/or OTHER PROCEDURE.     ____________________________________  _________________________________        ______________________________  Signature of Patient    Signature of Responsible Person                Printed Name of Responsible Person                                      ____________________________________  _____________________________                ________________________________  Signature of Witness        Date  Time         Relationship to Patient    STATEMENT OF PHYSICIAN My signature below affirms that prior to the time of the procedure; I have explained to the patient and/or his/her legal representative, the risks and benefits involved in the proposed treatment and any reasonable alternative to the proposed treatment. I have also explained the risks and benefits involved in refusal of the proposed treatment and alternatives to the proposed treatment and have answered the patient's  questions. If I have a significant financial interest in a co-management agreement or a significant financial interest in any product or implant, or other significant relationship used in this procedure/surgery, I have disclosed this and had a discussion with my patient.     _____________________________________________________              _____________________________  (Signature of Physician)                                                                                         (Date)                                   (Time)  Patient Name: Maryellen Alexandre      : 1955      Printed: 2025     Medical Record #: [id number]                                      Page 1 of 1

## (undated) NOTE — MR AVS SNAPSHOT
[facility]  [address] [place] [address]  [phone]               Thank you for choosing us for your health care visit with Eliseo Rojas MD.  We are glad to serve you and happy to provide you with this summary of your visit. * Albuterol Sulfate  (90 Base) MCG/ACT Aers   Inhale 2 puffs into the lungs every 4 (four) hours as needed. Commonly known as:  VENTOLIN HFA           * BuPROPion HCl ER (XL) 300 MG Tb24   Take 300 mg by mouth daily.    Commonly known as:  Emerita Prudent What changed:  Another medication with the same name was changed. Make sure you understand how and when to take each.            * Vitamin D3 2000 units Tabs   Take by oral route 2000 units daily   What changed:    - how much to take  - additional instructi

## (undated) NOTE — MR AVS SNAPSHOT
[facility]  [address] [place], [place] [address]ic [place] [id number] [phone]               Thank you for choosing us for your health care visit with Samy Nagy MD.  We are glad to serve you and happy to provide you with this summary of your visit. BuPROPion HCl ER (XL) 150 MG Tb24   TK 1 T PO QD   What changed:  Another medication with the same name was removed. Continue taking this medication, and follow the directions you see here.    Commonly known as:  WELLBUTRIN XL           Estradiol 0.1 MG/GM view more details from this visit by going to [email or web address]. If you've recently had a stay at the Hospital you can access your discharge instructions in [email or web address] by going to Visits < Admission Summaries.  If you've been to the Emergency Depar priority on exercise in your life                    Visit [facility] online at  [email or web address]

## (undated) NOTE — LETTER
[facility]  [address], [place], [place]    Authorization for Surgical Operation and Procedure                               I hereby authorize Joe Burris MD, my physician and his/her assistants (if applicable), which may include medical students, residents, and/or fellows, to perform the following surgical operation/ procedure and administer such anesthesia as may be determined necessary by my physician: Operation/Procedure name (s) COLONOSCOPY on Maryellen Alexandre   2.   I recognize that during the surgical operation/procedure, unforeseen conditions may necessitate additional or different procedures than those listed above.  I, therefore, further authorize and request that the above-named surgeon, assistants, or designees perform such procedures as are, in their judgment, necessary and desirable.    3.   My surgeon/physician has discussed prior to my surgery the potential benefits, risks and side effects of this procedure; the likelihood of achieving goals; and potential problems that might occur during recuperation.  They also discussed reasonable alternatives to the procedure, including risks, benefits, and side effects related to the alternatives and risks related to not receiving this procedure.  I have had all my questions answered and I acknowledge that no guarantee has been made as to the result that may be obtained.    4.   Should the need arise during my operation/procedure, which includes change of level of care prior to discharge, I also consent to the administration of blood and/or blood products.  Further, I understand that despite careful testing and screening of blood or blood products by collecting agencies, I may still be subject to ill effects as a result of receiving a blood transfusion and/or blood products.  The following are some, but not all, of the potential risks that can occur: fever and allergic reactions, hemolytic reactions, transmission of diseases such as  Hepatitis, AIDS and Cytomegalovirus (CMV) and fluid overload.  In the event that I wish to have an autologous transfusion of my own blood, or a directed donor transfusion, I will discuss this with my physician.  Check only if Refusing Blood or Blood Products  I understand refusal of blood or blood products as deemed necessary by my physician may have serious consequences to my condition to include possible death. I hereby assume responsibility for my refusal and release the hospital, its personnel, and my physicians from any responsibility for the consequences of my refusal.    o  Refuse   5.   I authorize the use of any specimen, organs, tissues, body parts or foreign objects that may be removed from my body during the operation/procedure for diagnosis, research or teaching purposes and their subsequent disposal by hospital authorities.  I also authorize the release of specimen test results and/or written reports to my treating physician on the hospital medical staff or other referring or consulting physicians involved in my care, at the discretion of the Pathologist or my treating physician.    6.   I consent to the photographing or videotaping of the operations or procedures to be performed, including appropriate portions of my body for medical, scientific, or educational purposes, provided my identity is not revealed by the pictures or by descriptive texts accompanying them.  If the procedure has been photographed/videotaped, the surgeon will obtain the original picture, image, videotape or CD.  The hospital will not be responsible for storage, release or maintenance of the picture, image, tape or CD.    7.   I consent to the presence of a  or observers in the operating room as deemed necessary by my physician or their designees.    8.   I recognize that in the event my procedure results in extended X-Ray/fluoroscopy time, I may develop a skin reaction.    9. If I have a Do Not Attempt  Resuscitation (DNAR) order in place, that status will be suspended while in the operating room, procedural suite, and during the recovery period unless otherwise explicitly stated by me (or a person authorized to consent on my behalf). The surgeon or my attending physician will determine when the applicable recovery period ends for purposes of reinstating the DNAR order.  10. Patients having a sterilization procedure: I understand that if the procedure is successful the results will be permanent and it will therefore be impossible for me to inseminate, conceive, or bear children.  I also understand that the procedure is intended to result in sterility, although the result has not been guaranteed.   11. I acknowledge that my physician has explained sedation/analgesia administration to me including the risk and benefits I consent to the administration of sedation/analgesia as may be necessary or desirable in the judgment of my physician.    I CERTIFY THAT I HAVE READ AND FULLY UNDERSTAND THE ABOVE CONSENT TO OPERATION and/or OTHER PROCEDURE.     ____________________________________  _________________________________        ______________________________  Signature of Patient    Signature of Responsible Person                Printed Name of Responsible Person                                      ____________________________________  _____________________________                ________________________________  Signature of Witness        Date  Time         Relationship to Patient    STATEMENT OF PHYSICIAN My signature below affirms that prior to the time of the procedure; I have explained to the patient and/or his/her legal representative, the risks and benefits involved in the proposed treatment and any reasonable alternative to the proposed treatment. I have also explained the risks and benefits involved in refusal of the proposed treatment and alternatives to the proposed treatment and have answered the patient's  questions. If I have a significant financial interest in a co-management agreement or a significant financial interest in any product or implant, or other significant relationship used in this procedure/surgery, I have disclosed this and had a discussion with my patient.     _____________________________________________________              _____________________________  (Signature of Physician)                                                                                         (Date)                                   (Time)  Patient Name: Maryellen Alexandre      : 1955      Printed: 7/15/2024     Medical Record #: [id number]                                      Page 1 of 1

## (undated) NOTE — Clinical Note
ASTHMA ACTION PLAN for David Forbes     : 1955     Date: 2017  Doctor:  Matt Quigley MD  Phone for doctor or clinic: [address], [address] [place] [phone]      ACT or call 911 immediately! If symptoms improve, call office for appointment immediately.     Albuterol inhaler 2 puffs every 20 minutes for three treatments       Don't forget:  · Rinse mouth after using inhaler  · Use spacer for inhaler  · Remember to get yo

## (undated) NOTE — MR AVS SNAPSHOT
[facility]  [address] [place] [address], [address] [place] [address]               Thank you for choosing us for your health care visit with Sandra Livingston MD.  We are glad to serve you and happy to provide you with this summary Inhale 2 puffs into the lungs every 4 (four) hours as needed. Commonly known as:  VENTOLIN HFA           BuPROPion HCl ER (XL) 300 MG Tb24   Take 300 mg by mouth daily.    Commonly known as:  WELLBUTRIN XL           Estradiol 0.1 MG/GM Crea   Use 1 gm per Order:  Nephrology - Internal    Luther Randall MD [facility]   [address] [address]   Phone:  [phone]   Fax:  [phone]         Follow-up Instructions     Return in about 3 months (around 5/14/2017), or if symptoms worsen or fail to impro requirements for authorization, please wait 5-7 days and then contact your physician's office. At that time, you will be provided with any authorization numbers or be assured that none are required. You can then schedule your appointment.  Failure to obtain ? Make sure carpets and area rugs have skid proof backing. ? Do not use slippery wax on bare floors. ? Keep furniture in its accustomed place. ? If you have pets, be careful that you don’t trip over them. OUTSIDE SAFETY TIPS  ?  Always wear good shoe

## (undated) NOTE — LETTER
[facility]  Administration of Anesthesia  I, Maryellen Alexandre agree to be cared for by a physician anesthesiologist alone and/or with a nurse anesthetist, who is specially trained to monitor me and give me medicine to put me to sleep or keep me comfortable during my procedure    I understand that my anesthesiologist and/or anesthetist is not an employee or agent of [facility] or [employer]. He or she works for [facility]    As the patient asking for anesthesia services, I agree to:  Allow the anesthesiologist (anesthesia doctor) to give me medicine and do additional procedures as necessary. Some examples are: Starting or using an “IV” to give me medicine, fluids or blood during my procedure, and having a breathing tube placed to help me breathe when I’m asleep (intubation). In the event that my heart stops working properly, I understand that my anesthesiologist will make every effort to sustain my life, unless otherwise directed by [facility] Do Not Resuscitate documents.  Tell my anesthesia doctor before my procedure:  If I am pregnant.  The last time that I ate or drank.  iii. All of the medicines I take (including prescriptions, herbal supplements, and pills I can buy without a prescription (including street drugs/illegal medications). Failure to inform my anesthesiologist about these medicines may increase my risk of anesthetic complications.  iv.If I am allergic to anything or have had a reaction to anesthesia before.  I understand how the anesthesia medicine will help me (benefits).  I understand that with any type of anesthesia medicine there are risks:  The most common risks are: nausea, vomiting, sore throat, muscle soreness, damage to my eyes, mouth, or teeth (from breathing tube placement).  Rare risks include: remembering what happened during my procedure, allergic reactions to medications, injury to my airway, heart, lungs, vision, nerves, or  muscles and in extremely rare instances death.  My doctor has explained to me other choices available to me for my care (alternatives).  Pregnant Patients (“epidural”):  I understand that the risks of having an epidural (medicine given into my back to help control pain during labor), include itching, low blood pressure, difficulty urinating, headache or slowing of the baby’s heart. Very rare risks include infection, bleeding, seizure, irregular heart rhythms and nerve injury.  Regional Anesthesia (“spinal”, “epidural”, & “nerve blocks”):  I understand that rare but potential complications include headache, bleeding, infection, seizure, irregular heart rhythms, and nerve injury.    _____________________________________________________________________________  Patient (or Representative) Signature/Relationship to Patient  Date   Time    _____________________________________________________________________________   Name (if used)    Language/Organization   Time    _____________________________________________________________________________  Nurse Anesthetist Signature     Date   Time  _____________________________________________________________________________  Anesthesiologist Signature     Date   Time  I have discussed the procedure and information above with the patient (or patient’s representative) and answered their questions. The patient or their representative has agreed to have anesthesia services.    _____________________________________________________________________________  Witness        Date   Time  I have verified that the signature is that of the patient or patient’s representative, and that it was signed before the procedure  Patient Name: Maryellen Alexandre     : 1955                 Printed: 7/15/2024 at 2:03 PM    Medical Record #: [id number]                                            Page 1 of 1  ----------ANESTHESIA CONSENT----------

## (undated) NOTE — MR AVS SNAPSHOT
[facility]  [address] [place] [address], [address] [place] [address]               Thank you for choosing us for your health care visit with Shailesh Josue MD.  We are glad to serve you and happy to provide you with this summary Commonly known as:  ESTRACE           Hyoscyamine Sulfate 0.125 MG Tabs   TAKE 1 TABLET EVERY 6 HOURS AS NEEDED   Commonly known as:  ANASPAZ,LEVSIN           KLONOPIN 2 MG Tabs   Generic drug:  ClonazePAM   Take 2 mg by mouth nightly.            Levothyrox Referral Orders      Normal Orders This Visit    [facility]  - INTERNAL [[id number] CUSTOM]  Order #:  [id number]         **REFERRAL REQUEST**    Your physician has referred you to a specialist.  Your physician or the clinic staff will provide you with the office, you can view your past visit information in [employer] by going to Visits < Visit Summaries. [employer] questions? Call [phone] for help. [employer] is NOT to be used for urgent needs. For medical emergencies, dial 911.            Visit [facility]